# Patient Record
Sex: MALE | Race: WHITE | Employment: OTHER | URBAN - METROPOLITAN AREA
[De-identification: names, ages, dates, MRNs, and addresses within clinical notes are randomized per-mention and may not be internally consistent; named-entity substitution may affect disease eponyms.]

---

## 2018-05-07 ENCOUNTER — TRANSFERRED RECORDS (OUTPATIENT)
Dept: HEALTH INFORMATION MANAGEMENT | Facility: CLINIC | Age: 83
End: 2018-05-07

## 2018-05-15 ENCOUNTER — TRANSFERRED RECORDS (OUTPATIENT)
Dept: HEALTH INFORMATION MANAGEMENT | Facility: CLINIC | Age: 83
End: 2018-05-15

## 2018-05-17 ENCOUNTER — TRANSFERRED RECORDS (OUTPATIENT)
Dept: HEALTH INFORMATION MANAGEMENT | Facility: CLINIC | Age: 83
End: 2018-05-17

## 2018-05-29 ENCOUNTER — TRANSFERRED RECORDS (OUTPATIENT)
Dept: HEALTH INFORMATION MANAGEMENT | Facility: CLINIC | Age: 83
End: 2018-05-29

## 2018-05-30 ENCOUNTER — TRANSFERRED RECORDS (OUTPATIENT)
Dept: HEALTH INFORMATION MANAGEMENT | Facility: CLINIC | Age: 83
End: 2018-05-30

## 2018-06-07 ENCOUNTER — TRANSFERRED RECORDS (OUTPATIENT)
Dept: HEALTH INFORMATION MANAGEMENT | Facility: CLINIC | Age: 83
End: 2018-06-07

## 2018-07-09 ENCOUNTER — TRANSFERRED RECORDS (OUTPATIENT)
Dept: HEALTH INFORMATION MANAGEMENT | Facility: CLINIC | Age: 83
End: 2018-07-09

## 2018-07-09 LAB
CREAT SERPL-MCNC: 3.4 MG/DL (ref 0.6–1.3)
GFR SERPL CREATININE-BSD FRML MDRD: 15 ML/MIN/1.73M2 (ref 60–130)
GLUCOSE SERPL-MCNC: 290 MG/DL (ref 70–99)
POTASSIUM SERPL-SCNC: 4.5 MMOL/L (ref 3.5–5.1)

## 2018-07-10 ENCOUNTER — TRANSFERRED RECORDS (OUTPATIENT)
Dept: HEALTH INFORMATION MANAGEMENT | Facility: CLINIC | Age: 83
End: 2018-07-10

## 2018-07-13 ENCOUNTER — TRANSFERRED RECORDS (OUTPATIENT)
Dept: HEALTH INFORMATION MANAGEMENT | Facility: CLINIC | Age: 83
End: 2018-07-13

## 2018-07-26 ENCOUNTER — TRANSFERRED RECORDS (OUTPATIENT)
Dept: HEALTH INFORMATION MANAGEMENT | Facility: CLINIC | Age: 83
End: 2018-07-26

## 2018-08-01 ENCOUNTER — TRANSFERRED RECORDS (OUTPATIENT)
Dept: HEALTH INFORMATION MANAGEMENT | Facility: CLINIC | Age: 83
End: 2018-08-01

## 2018-08-02 ENCOUNTER — MEDICAL CORRESPONDENCE (OUTPATIENT)
Dept: HEALTH INFORMATION MANAGEMENT | Facility: CLINIC | Age: 83
End: 2018-08-02

## 2018-08-03 ENCOUNTER — DOCUMENTATION ONLY (OUTPATIENT)
Dept: OTHER | Facility: CLINIC | Age: 83
End: 2018-08-03

## 2018-08-07 ENCOUNTER — ASSISTED LIVING VISIT (OUTPATIENT)
Dept: GERIATRICS | Facility: CLINIC | Age: 83
End: 2018-08-07
Payer: COMMERCIAL

## 2018-08-07 VITALS
HEIGHT: 69 IN | RESPIRATION RATE: 20 BRPM | HEART RATE: 56 BPM | OXYGEN SATURATION: 97 % | TEMPERATURE: 97.3 F | SYSTOLIC BLOOD PRESSURE: 140 MMHG | DIASTOLIC BLOOD PRESSURE: 70 MMHG | WEIGHT: 214 LBS | BODY MASS INDEX: 31.7 KG/M2

## 2018-08-07 DIAGNOSIS — I10 BENIGN ESSENTIAL HYPERTENSION: ICD-10-CM

## 2018-08-07 DIAGNOSIS — I73.9 PVD (PERIPHERAL VASCULAR DISEASE) (H): ICD-10-CM

## 2018-08-07 DIAGNOSIS — L97.519: ICD-10-CM

## 2018-08-07 DIAGNOSIS — Z71.89 ADVANCE CARE PLANNING: ICD-10-CM

## 2018-08-07 DIAGNOSIS — I65.23 BILATERAL CAROTID ARTERY OCCLUSION: ICD-10-CM

## 2018-08-07 DIAGNOSIS — F43.21 ADJUSTMENT DISORDER WITH DEPRESSED MOOD: ICD-10-CM

## 2018-08-07 DIAGNOSIS — I25.10 ASCVD (ARTERIOSCLEROTIC CARDIOVASCULAR DISEASE): Primary | ICD-10-CM

## 2018-08-07 DIAGNOSIS — Z87.39 H/O: GOUT: ICD-10-CM

## 2018-08-07 DIAGNOSIS — N18.4 CKD (CHRONIC KIDNEY DISEASE) STAGE 4, GFR 15-29 ML/MIN (H): ICD-10-CM

## 2018-08-07 DIAGNOSIS — M19.90 INFLAMMATORY ARTHRITIS: ICD-10-CM

## 2018-08-07 DIAGNOSIS — E11.8 TYPE 2 DIABETES MELLITUS WITH COMPLICATION, WITHOUT LONG-TERM CURRENT USE OF INSULIN (H): ICD-10-CM

## 2018-08-07 DIAGNOSIS — Z86.73 H/O: CVA (CEREBROVASCULAR ACCIDENT): ICD-10-CM

## 2018-08-07 DIAGNOSIS — F01.53 MULTI-INFARCT DEMENTIA WITH DEPRESSION (H): ICD-10-CM

## 2018-08-07 PROCEDURE — 99497 ADVNCD CARE PLAN 30 MIN: CPT | Performed by: NURSE PRACTITIONER

## 2018-08-07 RX ORDER — POLYETHYLENE GLYCOL 3350
17 POWDER (GRAM) MISCELLANEOUS DAILY PRN
COMMUNITY

## 2018-08-07 RX ORDER — TIMOLOL MALEATE 5 MG/ML
1 SOLUTION/ DROPS OPHTHALMIC 2 TIMES DAILY
COMMUNITY
End: 2018-10-09

## 2018-08-07 RX ORDER — ASPIRIN 325 MG
81 TABLET ORAL DAILY
COMMUNITY
End: 2018-09-12 | Stop reason: DRUGHIGH

## 2018-08-07 RX ORDER — MULTIPLE VITAMINS W/ MINERALS TAB 9MG-400MCG
1 TAB ORAL DAILY
COMMUNITY
End: 2018-09-11

## 2018-08-07 RX ORDER — AMOXICILLIN 500 MG
1200 CAPSULE ORAL DAILY
COMMUNITY
End: 2018-09-11

## 2018-08-07 RX ORDER — VITS A,C,E/LUTEIN/MINERALS 300MCG-200
1 TABLET ORAL 2 TIMES DAILY
COMMUNITY
End: 2018-09-11

## 2018-08-07 NOTE — LETTER
"    8/7/2018        RE: Cecil Martinez  [facility]  [address] [place] [address]        [facility]  PRIMARY CARE PROVIDER AND CLINIC:  Leilani Aguila [address] / [place] [address]  Chief Complaint   Patient presents with     [facility]     [facility] Medical Record Number:  [id number]    HPI:    Cecil Martinez is a 88 year old  (7/8/1930),admitted to the [facility] from [facility], [place], [place] on 8/6/18.  Admitted to this facility for  rehab, medical management and nursing care.  HPI information obtained from: facility chart records, facility staff, patient report, [facility] chart review, Care Everywhere Epic chart review and family/first contact daughter report.      Current issues are:      ASCVD (arteriosclerotic cardiovascular disease)  Bilateral carotid artery occlusion  H/O: CVA (cerebrovascular accident)  Minimal history from previous provider as patient came from [place], [place]. Long conversation with daughter regarding patient's history. Apparently patient was fairly heathy up until April 2018 when he presented to ED with stroke like symptoms. Patient found to have several lacunar strokes \"probably emboli from internal carotid arteries\". L carotid artery completely occluded, R carotid severe. Patient had additional strokes during rehab (May & July). Daughter reports patient & family declined intervention for occluded carotid arteries. Patient previous smoker: 25-50 pk/yr. Patient denies CP, SOB, cough, dizziness, fevers, chills, HA, N/V, dysuria or bowel abnormalities.     Multi-infarct dementia with depression  Adjustment disorder with depressed mood  Onset following multiple strokes. Was started on Lexapro & Zoloft (no longer on Lexapro, unsure why). Daughter repeatedly states her main concern is his depression & anger. \"I want him to be happy\".   ADL's/Functional status   Bathing: with assist  Dressing: with " assist  Eating: independent   Transfers: with assist  Toileting: with assist  Continence  Urine: continent with assist  Stool: continent with assist  Dementia: moderate, at this time- no access to cognitive testing    PVD (peripheral vascular disease) (H)  Ischemic toe ulcer, right, with unspecified severity (H)  Patient went through revascularization to LLE which successfully restored some blood supply to extremity. Ongoing ulcer to R toe, MRI 5/29 of R toe indicated no osteomyelitis. Patient reports discomfort with palpation to R 3rd toe.     Type 2 diabetes mellitus with complication, without long-term current use of insulin (H)  No BG on file. Could not find A1c in paperwork. Patient taking metformin 500mg BID.    Inflammatory arthritis  H/O: gout  Remains on allopurinol 100mg daily, unclear when this was started or when last gout attack occurred. Patient denies pain today. Utilizing diclofenac gel QID to R wrist for arthritis.     Benign essential hypertension  CKD (chronic kidney disease) stage 4, 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)  BP today 140/70 with HR 56. Patient denies dizziness, headache or palpitations. Last BMP from May indicates Cr 3.4 & GFR 15    Advanced directives  Current code status indicates Full Code.       Last 3 BPs: (7/23) 120/73   HR Ranges: 67   Admission Weight: 214lbs  Current Weight: (7/17) 214 lbs    CODE STATUS/ADVANCE DIRECTIVES DISCUSSION:   CPR/Full code   Patient's living condition: lives in an assisted living facility    ALLERGIES:Hmg-coa-r inhibitors and Sitagliptin  PAST MEDICAL HISTORY:  has a past medical history of Carotid stenosis; CVA (cerebral vascular accident) (H); Diabetes mellitus (H); and PVD (peripheral vascular disease) (H).  PAST SURGICAL HISTORY:  has a past surgical history that includes knee surgery and stroke or tia >120 days cea (05/02/2018).  FAMILY HISTORY: family history includes Cerebrovascular Disease in his father.  SOCIAL HISTORY:  reports that he has quit smoking.  His smoking use included Pipe. He does not have any smokeless tobacco history on file.    Post Discharge Medication Reconciliation Status: discharge medications reconciled and changed, per note/orders (see AVS).  Current Outpatient Prescriptions   Medication Sig Dispense Refill     Acetaminophen (MAPAP PO) Take 650 mg by mouth every 4 hours as needed for mild pain or fever       ALLOPURINOL PO Take 100 mg by mouth       ARTIFICIAL TEAR OP Apply 1 drop to eye 4 times daily as needed To both eyes qid prn for dry/itchy eyes.       aspirin 325 MG tablet Take 325 mg by mouth daily       cholecalciferol (VITAMIN D3) 1000 UNIT tablet Take 1,000 Units by mouth 2 times daily       COENZYME Q-10 PO Take 200 mg by mouth daily       diclofenac (VOLTAREN) 1 % GEL topical gel Place onto the skin 4 times daily Apply to right wrist small layer TD       EMOLLIENT EX Externally apply topically 2 times daily To dry patch right hand index finger       METFORMIN HCL PO Take 500 mg by mouth 2 times daily       METOPROLOL SUCCINATE ER PO Take 100 mg by mouth 2 times daily       multivitamin (OCUVITE) TABS tablet Take 1 tablet by mouth 2 times daily       multivitamin, therapeutic with minerals (THERA-VIT-M) TABS tablet Take 1 tablet by mouth daily       Omega-3 Fatty Acids (FISH OIL) 1200 MG capsule Take 1,200 mg by mouth daily       ONDANSETRON PO Take 4 mg by mouth every 4 hours as needed for nausea       polyethylene glycol 3350 POWD 17 g daily as needed       PRAVASTATIN SODIUM PO Take 40 mg by mouth daily       Rosiglitazone Maleate (AVANDIA PO) Take 4 mg by mouth daily       SERTRALINE HCL PO Take 150 mg by mouth daily        Ticagrelor (BRILINTA PO) Take 90 mg by mouth 2 times daily       timolol (TIMOPTIC) 0.5 % ophthalmic solution Place 1 drop into both eyes 2 times daily         ROS:  10 point ROS of systems including Constitutional, Eyes, Respiratory, Cardiovascular, Gastroenterology, Genitourinary, Integumentary,  "Muscularskeletal, Psychiatric were all negative except for pertinent positives noted in my HPI.    Exam:  /70  Pulse 56  Temp 97.3  F (36.3  C)  Resp 20  Ht 5' 9\" (1.753 m)  Wt 214 lb (97.1 kg)  SpO2 97%  BMI 31.6 kg/m2  GENERAL APPEARANCE:  Alert, in no distress, uncooperative, resting in recliner  ENT:  Mouth and posterior oropharynx normal, moist mucous membranes  EYES:  EOM normal, Conjunctiva and lids normal  NECK:  No adenopathy,masses or thyromegaly  RESP:  respiratory effort and palpation of chest normal, lungs clear to auscultation , no respiratory distress  CV:  Palpation and auscultation of heart done , regular rate and rhythm, no murmur, rub, or gallop, no edema  ABDOMEN:  normal bowel sounds, soft, nontender, no hepatosplenomegaly or other masses, no guarding or rebound  M/S:   equal strength, ambulates with A1 & walker, sensation intact, boot to R foot  SKIN:  wound to R 3rd toe, no s/s infection, thickened skin/scab covering ulcer, no drainage, no periwound redness  NEURO:   Cranial nerves 2-12 are normal tested and grossly at patient's baseline, normal speech, equal strength  PSYCH:  insight and judgement impaired, memory impaired , irritable    Lab/Diagnostic data:  No recent labs available    ASSESSMENT/PLAN:  (I25.10) ASCVD (arteriosclerotic cardiovascular disease)  (primary encounter diagnosis)  (I65.23) Bilateral carotid artery occlusion  (Z86.73) H/O: CVA (cerebrovascular accident)  Comment: CVIs April, May & July, cognitive deficits, high risk for additional strokes & clots    Plan:   --order physical therapy & occupational therapy for strength training, endurance, safety & cognitive testing   --continue asa 325mg daily, pravastatin 40mg daily & Brilinta 90mg BID  --continue supplements CoQ10 & fish oil  --HTN and HLD management for secondary stroke prevention   --monitor for stroke like symptoms   --consider hospice following completion of therapies     (F01.51,  F32.9) " Multi-infarct dementia with depression  (F43.21) Adjustment disorder with depressed mood  Comment: acute following stroke, poorly controlled   Plan:   --occupational therapy for cognitive testing  --provide assist with all ADLs  --appropriate for secure memory care unit  --increase Zoloft for ongoing depressed mood  --monitor for increase in behaviors or change in sleep & eating patterns    (I73.9) PVD (peripheral vascular disease) (H)  (L97.519) Ischemic toe ulcer, right, with unspecified severity (H)  Comment: unclear onset, no s/s of infection, LLE revascularized   Plan:   --home care RN for wound care  --continue current wound care for now: cleanse, betadine, 2x2 & kerlix daily    (E11.8) Type 2 diabetes mellitus with complication, without long-term current use of insulin (H)  Comment: chronic, unclear control  Plan:   --continue Avandia 4mg daily & Metformin 500mg BID  --follow A1c & kidney function  --hold off on daily BG checks for now    (M19.90) Inflammatory arthritis  (Z87.39) H/O: gout  Comment: chronic, stable  Plan:   --continue allopurinol 100mg daily  --recheck uric acid level  --continue diclofenac gel QID to R wrist  --monitor for s/s infection    (I10) Benign essential hypertension  (N18.4) CKD (chronic kidney disease) stage 4, 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)  Comment: chronic, controlled & stable  Plan:   --continue metoprolol succinate 100mg daily  --avoid nephrotoxic medications   --follow kidney function & BPs, adjust as clinically indicated     (Z71.89) Advance care planning  Comment: I an additional 22 minutes face to face with patient & with patient's daughter discussing advance care planning, goals of care & completing POLST.  Plan:   --change code status to DNR/DNI, comfort cares, avoid hospitalizations   --will complete POLST & fax to     Orders:  1. Increase Zoloft to 150 mg po daily - depression  2. Home care PT,OT, RN/wound care   3. CMP, CBC w/ plt, A1C, fasting lipid panel & TSH - CKD,  anemia, DM2, hyperlipidemia, depression & confusion       Total time with a new patient visit in the assisted livin minutes including patient visit, review of past records, discussions about the POC and care coordination with the patient and family, daughter. Greater than 50% of total time spent with counseling and coordinating care regarding conditions and treatment options as mentioned above.    Electronically signed by:  Leilani Aguila NP        Documentation of Face-to-Face and Certification for Home Health Services     Patient: Cecil Martinez   YOB: 1930  MR Number: [id number]  Today's Date: 2018    I certify that patient: Cecil Martinez is under my care and that I, or a nurse practitioner or physician's assistant working with me, had a face-to-face encounter that meets the physician face-to-face encounter requirements with this patient on: 18.    This encounter with the patient was in whole, or in part, for the following medical condition, which is the primary reason for home health care:   1. ASCVD (arteriosclerotic cardiovascular disease)    2. Bilateral carotid artery occlusion    3. H/O: CVA (cerebrovascular accident)    4. Multi-infarct dementia with depression    5. Adjustment disorder with depressed mood    6. PVD (peripheral vascular disease) (H)    7. Ischemic toe ulcer, right, with unspecified severity (H)    8. Type 2 diabetes mellitus with complication, without long-term current use of insulin (H)    9. Inflammatory arthritis    10. H/O: gout    11. Benign essential hypertension    12. CKD (chronic kidney disease) stage 4, 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)    13. Advance care planning      .    I certify that, based on my findings, the following services are medically necessary home health services: Nursing, Occupational Therapy and Physical Therapy.    My clinical findings support the need for the above services because: Nurse is needed: wound care to R 3rd toes ulcer.,  Occupational Therapy Services are needed to assess and treat cognitive ability and address ADL safety due to impairment in cognitive deficits 2/2 multiple strokes. and Physical Therapy Services are needed to assess and treat the following functional impairments: physical deconditioning, safety & balance.    Further, I certify that my clinical findings support that this patient is homebound (i.e. absences from home require considerable and taxing effort and are for medical reasons or [employer] services or infrequently or of short duration when for other reasons) because: Requires assistance of another person or specialized equipment to access medical services because patient: Is prone to wander/get lost without assistance. and Requires supervision of another for safe transfer...    Based on the above findings. I certify that this patient is confined to the home and needs intermittent skilled nursing care, physical therapy and/or speech therapy.  The patient is under my care, and I have initiated the establishment of the plan of care.  This patient will be followed by a physician who will periodically review the plan of care.  Physician/Provider to provide follow up care: Leilani Aguila    Responsible Medicare certified P[employer] Physician: Electronically signed by Dr. Devon Reyes MD, and only signing for initial order. Please send all follow up questions and concerns or needed follow up signatures to the PCP Leilani Aguila.    Physician Signature: See electronic signature associated with these discharge orders.  Date: 8/13/2018        Sincerely,        Leilani Aguila NP

## 2018-08-07 NOTE — PROGRESS NOTES
"[place] [facility]  PRIMARY CARE PROVIDER AND CLINIC:  Leilani Aguila [address] / [place] [address]  Chief Complaint   Patient presents with     [facility]     [place] Medical Record Number:  [id number]    HPI:    Cecil Martinez is a 88 year old  (7/8/1930),admitted to the [facility] from [facility], [place], [place] on 8/6/18.  Admitted to this facility for  rehab, medical management and nursing care.  HPI information obtained from: facility chart records, facility staff, patient report, [facility] chart review, Care Everywhere Epic chart review and family/first contact daughter report.      Current issues are:      ASCVD (arteriosclerotic cardiovascular disease)  Bilateral carotid artery occlusion  H/O: CVA (cerebrovascular accident)  Minimal history from previous provider as patient came from [place], [place]. Long conversation with daughter regarding patient's history. Apparently patient was fairly heathy up until April 2018 when he presented to ED with stroke like symptoms. Patient found to have several lacunar strokes \"probably emboli from internal carotid arteries\". L carotid artery completely occluded, R carotid severe. Patient had additional strokes during rehab (May & July). Daughter reports patient & family declined intervention for occluded carotid arteries. Patient previous smoker: 25-50 pk/yr. Patient denies CP, SOB, cough, dizziness, fevers, chills, HA, N/V, dysuria or bowel abnormalities.     Multi-infarct dementia with depression  Adjustment disorder with depressed mood  Onset following multiple strokes. Was started on Lexapro & Zoloft (no longer on Lexapro, unsure why). Daughter repeatedly states her main concern is his depression & anger. \"I want him to be happy\".   ADL's/Functional status   Bathing: with assist  Dressing: with assist  Eating: independent   Transfers: with assist  Toileting: with assist  Continence  Urine: continent with " assist  Stool: continent with assist  Dementia: moderate, at this time- no access to cognitive testing    PVD (peripheral vascular disease) (H)  Ischemic toe ulcer, right, with unspecified severity (H)  Patient went through revascularization to LLE which successfully restored some blood supply to extremity. Ongoing ulcer to R toe, MRI 5/29 of R toe indicated no osteomyelitis. Patient reports discomfort with palpation to R 3rd toe.     Type 2 diabetes mellitus with complication, without long-term current use of insulin (H)  No BG on file. Could not find A1c in paperwork. Patient taking metformin 500mg BID.    Inflammatory arthritis  H/O: gout  Remains on allopurinol 100mg daily, unclear when this was started or when last gout attack occurred. Patient denies pain today. Utilizing diclofenac gel QID to R wrist for arthritis.     Benign essential hypertension  CKD (chronic kidney disease) stage 4, 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)  BP today 140/70 with HR 56. Patient denies dizziness, headache or palpitations. Last BMP from May indicates Cr 3.4 & GFR 15    Advanced directives  Current code status indicates Full Code.       Last 3 BPs: (7/23) 120/73   HR Ranges: 67   Admission Weight: 214lbs  Current Weight: (7/17) 214 lbs    CODE STATUS/ADVANCE DIRECTIVES DISCUSSION:   CPR/Full code   Patient's living condition: lives in an assisted living facility    ALLERGIES:Hmg-coa-r inhibitors and Sitagliptin  PAST MEDICAL HISTORY:  has a past medical history of Carotid stenosis; CVA (cerebral vascular accident) (H); Diabetes mellitus (H); and PVD (peripheral vascular disease) (H).  PAST SURGICAL HISTORY:  has a past surgical history that includes knee surgery and stroke or tia >120 days cea (05/02/2018).  FAMILY HISTORY: family history includes Cerebrovascular Disease in his father.  SOCIAL HISTORY:  reports that he has quit smoking. His smoking use included Pipe. He does not have any smokeless tobacco history on file.    Post Discharge  Medication Reconciliation Status: discharge medications reconciled and changed, per note/orders (see AVS).  Current Outpatient Prescriptions   Medication Sig Dispense Refill     Acetaminophen (MAPAP PO) Take 650 mg by mouth every 4 hours as needed for mild pain or fever       ALLOPURINOL PO Take 100 mg by mouth       ARTIFICIAL TEAR OP Apply 1 drop to eye 4 times daily as needed To both eyes qid prn for dry/itchy eyes.       aspirin 325 MG tablet Take 325 mg by mouth daily       cholecalciferol (VITAMIN D3) 1000 UNIT tablet Take 1,000 Units by mouth 2 times daily       COENZYME Q-10 PO Take 200 mg by mouth daily       diclofenac (VOLTAREN) 1 % GEL topical gel Place onto the skin 4 times daily Apply to right wrist small layer TD       EMOLLIENT EX Externally apply topically 2 times daily To dry patch right hand index finger       METFORMIN HCL PO Take 500 mg by mouth 2 times daily       METOPROLOL SUCCINATE ER PO Take 100 mg by mouth 2 times daily       multivitamin (OCUVITE) TABS tablet Take 1 tablet by mouth 2 times daily       multivitamin, therapeutic with minerals (THERA-VIT-M) TABS tablet Take 1 tablet by mouth daily       Omega-3 Fatty Acids (FISH OIL) 1200 MG capsule Take 1,200 mg by mouth daily       ONDANSETRON PO Take 4 mg by mouth every 4 hours as needed for nausea       polyethylene glycol 3350 POWD 17 g daily as needed       PRAVASTATIN SODIUM PO Take 40 mg by mouth daily       Rosiglitazone Maleate (AVANDIA PO) Take 4 mg by mouth daily       SERTRALINE HCL PO Take 150 mg by mouth daily        Ticagrelor (BRILINTA PO) Take 90 mg by mouth 2 times daily       timolol (TIMOPTIC) 0.5 % ophthalmic solution Place 1 drop into both eyes 2 times daily         ROS:  10 point ROS of systems including Constitutional, Eyes, Respiratory, Cardiovascular, Gastroenterology, Genitourinary, Integumentary, Muscularskeletal, Psychiatric were all negative except for pertinent positives noted in my HPI.    Exam:  /70   "Pulse 56  Temp 97.3  F (36.3  C)  Resp 20  Ht 5' 9\" (1.753 m)  Wt 214 lb (97.1 kg)  SpO2 97%  BMI 31.6 kg/m2  GENERAL APPEARANCE:  Alert, in no distress, uncooperative, resting in recliner  ENT:  Mouth and posterior oropharynx normal, moist mucous membranes  EYES:  EOM normal, Conjunctiva and lids normal  NECK:  No adenopathy,masses or thyromegaly  RESP:  respiratory effort and palpation of chest normal, lungs clear to auscultation , no respiratory distress  CV:  Palpation and auscultation of heart done , regular rate and rhythm, no murmur, rub, or gallop, no edema  ABDOMEN:  normal bowel sounds, soft, nontender, no hepatosplenomegaly or other masses, no guarding or rebound  M/S:   equal strength, ambulates with A1 & walker, sensation intact, boot to R foot  SKIN:  wound to R 3rd toe, no s/s infection, thickened skin/scab covering ulcer, no drainage, no periwound redness  NEURO:   Cranial nerves 2-12 are normal tested and grossly at patient's baseline, normal speech, equal strength  PSYCH:  insight and judgement impaired, memory impaired , irritable    Lab/Diagnostic data:  No recent labs available    ASSESSMENT/PLAN:  (I25.10) ASCVD (arteriosclerotic cardiovascular disease)  (primary encounter diagnosis)  (I65.23) Bilateral carotid artery occlusion  (Z86.73) H/O: CVA (cerebrovascular accident)  Comment: CVIs April, May & July, cognitive deficits, high risk for additional strokes & clots    Plan:   --order physical therapy & occupational therapy for strength training, endurance, safety & cognitive testing   --continue asa 325mg daily, pravastatin 40mg daily & Brilinta 90mg BID  --continue supplements CoQ10 & fish oil  --HTN and HLD management for secondary stroke prevention   --monitor for stroke like symptoms   --consider hospice following completion of therapies     (F01.51,  F32.9) Multi-infarct dementia with depression  (F43.21) Adjustment disorder with depressed mood  Comment: acute following stroke, " poorly controlled   Plan:   --occupational therapy for cognitive testing  --provide assist with all ADLs  --appropriate for secure memory care unit  --increase Zoloft for ongoing depressed mood  --monitor for increase in behaviors or change in sleep & eating patterns    (I73.9) PVD (peripheral vascular disease) (H)  (L97.519) Ischemic toe ulcer, right, with unspecified severity (H)  Comment: unclear onset, no s/s of infection, LLE revascularized   Plan:   --home care RN for wound care  --continue current wound care for now: cleanse, betadine, 2x2 & kerlix daily    (E11.8) Type 2 diabetes mellitus with complication, without long-term current use of insulin (H)  Comment: chronic, unclear control  Plan:   --continue Avandia 4mg daily & Metformin 500mg BID  --follow A1c & kidney function  --hold off on daily BG checks for now    (M19.90) Inflammatory arthritis  (Z87.39) H/O: gout  Comment: chronic, stable  Plan:   --continue allopurinol 100mg daily  --recheck uric acid level  --continue diclofenac gel QID to R wrist  --monitor for s/s infection    (I10) Benign essential hypertension  (N18.4) CKD (chronic kidney disease) stage 4, 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)  Comment: chronic, controlled & stable  Plan:   --continue metoprolol succinate 100mg daily  --avoid nephrotoxic medications   --follow kidney function & BPs, adjust as clinically indicated     (Z71.89) Advance care planning  Comment: I an additional 22 minutes face to face with patient & with patient's daughter discussing advance care planning, goals of care & completing POLST.  Plan:   --change code status to DNR/DNI, comfort cares, avoid hospitalizations   --will complete POLST & fax to     Orders:  1. Increase Zoloft to 150 mg po daily - depression  2. Home care PT,OT, RN/wound care   3. CMP, CBC w/ plt, A1C, fasting lipid panel & TSH - CKD, anemia, DM2, hyperlipidemia, depression & confusion       Total time with a new patient visit in the assisted livin minutes  including patient visit, review of past records, discussions about the POC and care coordination with the patient and family, daughter. Greater than 50% of total time spent with counseling and coordinating care regarding conditions and treatment options as mentioned above.    Electronically signed by:  Leilani Aguila NP        Documentation of Face-to-Face and Certification for Home Health Services     Patient: Cecil Martinez   YOB: 1930  MR Number: [id number]  Today's Date: 8/13/2018    I certify that patient: Cecil Martinez is under my care and that I, or a nurse practitioner or physician's assistant working with me, had a face-to-face encounter that meets the physician face-to-face encounter requirements with this patient on: 8/7/18.    This encounter with the patient was in whole, or in part, for the following medical condition, which is the primary reason for home health care:   1. ASCVD (arteriosclerotic cardiovascular disease)    2. Bilateral carotid artery occlusion    3. H/O: CVA (cerebrovascular accident)    4. Multi-infarct dementia with depression    5. Adjustment disorder with depressed mood    6. PVD (peripheral vascular disease) (H)    7. Ischemic toe ulcer, right, with unspecified severity (H)    8. Type 2 diabetes mellitus with complication, without long-term current use of insulin (H)    9. Inflammatory arthritis    10. H/O: gout    11. Benign essential hypertension    12. CKD (chronic kidney disease) stage 4, 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)    13. Advance care planning      .    I certify that, based on my findings, the following services are medically necessary home health services: Nursing, Occupational Therapy and Physical Therapy.    My clinical findings support the need for the above services because: Nurse is needed: wound care to R 3rd toes ulcer., Occupational Therapy Services are needed to assess and treat cognitive ability and address ADL safety due to impairment in  cognitive deficits 2/2 multiple strokes. and Physical Therapy Services are needed to assess and treat the following functional impairments: physical deconditioning, safety & balance.    Further, I certify that my clinical findings support that this patient is homebound (i.e. absences from home require considerable and taxing effort and are for medical reasons or [employer] services or infrequently or of short duration when for other reasons) because: Requires assistance of another person or specialized equipment to access medical services because patient: Is prone to wander/get lost without assistance. and Requires supervision of another for safe transfer...    Based on the above findings. I certify that this patient is confined to the home and needs intermittent skilled nursing care, physical therapy and/or speech therapy.  The patient is under my care, and I have initiated the establishment of the plan of care.  This patient will be followed by a physician who will periodically review the plan of care.  Physician/Provider to provide follow up care: Leilani Aguila    Responsible Medicare certified PECOS Physician: Electronically signed by Dr. Devon Reyes MD, and only signing for initial order. Please send all follow up questions and concerns or needed follow up signatures to the PCP Leilani Aguila.    Physician Signature: See electronic signature associated with these discharge orders.  Date: 8/13/2018

## 2018-08-13 PROBLEM — Z87.39 H/O: GOUT: Status: ACTIVE | Noted: 2018-08-13

## 2018-08-13 PROBLEM — L97.529: Status: ACTIVE | Noted: 2018-08-13

## 2018-08-13 PROBLEM — N18.4 CKD (CHRONIC KIDNEY DISEASE) STAGE 4, GFR 15-29 ML/MIN (H): Status: ACTIVE | Noted: 2018-08-13

## 2018-08-13 PROBLEM — Z86.73 H/O: CVA (CEREBROVASCULAR ACCIDENT): Status: ACTIVE | Noted: 2018-08-13

## 2018-08-13 PROBLEM — M19.90 INFLAMMATORY ARTHRITIS: Status: ACTIVE | Noted: 2018-08-13

## 2018-08-13 PROBLEM — I25.10 ASCVD (ARTERIOSCLEROTIC CARDIOVASCULAR DISEASE): Status: ACTIVE | Noted: 2018-08-13

## 2018-08-13 PROBLEM — I73.9 PVD (PERIPHERAL VASCULAR DISEASE) (H): Status: ACTIVE | Noted: 2018-08-13

## 2018-08-13 PROBLEM — I65.23 BILATERAL CAROTID ARTERY OCCLUSION: Status: ACTIVE | Noted: 2018-08-13

## 2018-08-13 PROBLEM — E11.8 TYPE 2 DIABETES MELLITUS WITH COMPLICATION, WITHOUT LONG-TERM CURRENT USE OF INSULIN (H): Status: ACTIVE | Noted: 2018-08-13

## 2018-08-13 PROBLEM — I10 BENIGN ESSENTIAL HYPERTENSION: Status: ACTIVE | Noted: 2018-08-13

## 2018-08-13 PROBLEM — N18.30 CKD (CHRONIC KIDNEY DISEASE) STAGE 3, GFR 30-59 ML/MIN (H): Status: ACTIVE | Noted: 2018-08-13

## 2018-08-13 PROBLEM — F01.53 MULTI-INFARCT DEMENTIA WITH DEPRESSION (H): Status: ACTIVE | Noted: 2018-08-13

## 2018-08-13 PROBLEM — F43.21 ADJUSTMENT DISORDER WITH DEPRESSED MOOD: Status: ACTIVE | Noted: 2018-08-13

## 2018-08-18 ENCOUNTER — DOCUMENTATION ONLY (OUTPATIENT)
Dept: OTHER | Facility: CLINIC | Age: 83
End: 2018-08-18

## 2018-08-23 ENCOUNTER — TELEPHONE (OUTPATIENT)
Dept: FAMILY MEDICINE | Facility: CLINIC | Age: 83
End: 2018-08-23

## 2018-08-23 NOTE — TELEPHONE ENCOUNTER
Ernestine with OT with [facility] care OT requesting verbal orders for patient from [facility].  OT assessment was completed yesterday.  OT is requesting  2 visits this month -August and 3 visits for September for ADL's, cognitive assessment, upper extremity strengthening, safety service and equipment recommendation.   Verbal orders given.    Yesenia Shipman RN  [employer] [facility]

## 2018-08-26 DIAGNOSIS — Z78.9 TAKES DIETARY SUPPLEMENTS: ICD-10-CM

## 2018-08-26 DIAGNOSIS — F41.9 ANXIETY: Primary | ICD-10-CM

## 2018-08-26 DIAGNOSIS — E55.9 VITAMIN D DEFICIENCY: ICD-10-CM

## 2018-08-26 DIAGNOSIS — E11.69 TYPE 2 DIABETES MELLITUS WITH OTHER SPECIFIED COMPLICATION, UNSPECIFIED LONG TERM INSULIN USE STATUS: ICD-10-CM

## 2018-08-26 DIAGNOSIS — I25.10 ASCVD (ARTERIOSCLEROTIC CARDIOVASCULAR DISEASE): ICD-10-CM

## 2018-08-26 DIAGNOSIS — Z87.39 HX OF GOUT: ICD-10-CM

## 2018-08-26 DIAGNOSIS — F32.A DEPRESSION, UNSPECIFIED DEPRESSION TYPE: ICD-10-CM

## 2018-08-26 DIAGNOSIS — I10 ESSENTIAL HYPERTENSION: ICD-10-CM

## 2018-08-27 ENCOUNTER — TELEPHONE (OUTPATIENT)
Dept: FAMILY MEDICINE | Facility: CLINIC | Age: 83
End: 2018-08-27

## 2018-08-27 NOTE — TELEPHONE ENCOUNTER
Reason for Call: Request for an order or referral:    Order or referral being requested: Orders for a  one time for the patient to adjust to his new enviroment.      Date needed: as soon as possible    Has the patient been seen by the PCP for this problem? NO    Additional comments: Per AB I am sending this request for orders for this patient, as the Home Care nurse stated that you were his attending physician    Phone number Home care nurse can be reached at: [phone]    Best Time:  anytime    Can we leave a detailed message on this number?  YES    Call taken on 8/27/2018 at 12:16 PM by Brittany Chicas

## 2018-08-27 NOTE — TELEPHONE ENCOUNTER
Detailed message left for Koko advising TS okayed one time SW visit.      Ale Iqbal, BS, RN, N  [facility]) [phone]

## 2018-08-28 ENCOUNTER — ASSISTED LIVING VISIT (OUTPATIENT)
Dept: GERIATRICS | Facility: CLINIC | Age: 83
End: 2018-08-28
Payer: COMMERCIAL

## 2018-08-28 VITALS
SYSTOLIC BLOOD PRESSURE: 159 MMHG | TEMPERATURE: 93.7 F | HEART RATE: 61 BPM | WEIGHT: 218 LBS | RESPIRATION RATE: 21 BRPM | OXYGEN SATURATION: 98 % | DIASTOLIC BLOOD PRESSURE: 71 MMHG | BODY MASS INDEX: 32.19 KG/M2

## 2018-08-28 DIAGNOSIS — F01.53 MULTI-INFARCT DEMENTIA WITH DEPRESSION (H): ICD-10-CM

## 2018-08-28 DIAGNOSIS — F43.21 ADJUSTMENT DISORDER WITH DEPRESSED MOOD: Primary | ICD-10-CM

## 2018-08-28 DIAGNOSIS — I10 BENIGN ESSENTIAL HYPERTENSION: ICD-10-CM

## 2018-08-28 DIAGNOSIS — E11.8 TYPE 2 DIABETES MELLITUS WITH COMPLICATION, WITHOUT LONG-TERM CURRENT USE OF INSULIN (H): ICD-10-CM

## 2018-08-28 DIAGNOSIS — N18.4 CKD (CHRONIC KIDNEY DISEASE) STAGE 4, GFR 15-29 ML/MIN (H): ICD-10-CM

## 2018-08-28 NOTE — LETTER
"    8/28/2018        RE: Cecil Martinez  [facility]  [address] [place] [address]        [place] [facility]    Chief Complaint   Patient presents with     RECHECK       [place] Medical Record Number:  [id number]    HPI:    Cecil Martinez is a 88 year old  (7/8/1930), who is being seen today for an episodic care visit at [facility], [place].  HPI information obtained from: facility chart records, facility staff and patient report.    Today's concern is:  Multi-infarct dementia with depression  Adjustment disorder with depressed mood  Onset following multiple strokes. Was started on Lexapro & Zoloft (no longer on Lexapro, unsure why). Daughter repeatedly states her main concern is his depression & anger. \"I want him to be happy\". Patient has had some improvement in behaviors however continues with occasional aggression & threats to staff. Apparently \"allowed\" wife to leave apartment with staff for the first time since she arrived. Patient noticeably upset today, yelling, angry with writer \"get out of here\".    Type 2 diabetes mellitus with complication, without long-term current use of insulin (H)  No BG on file. Could not find A1c in paperwork, patient refused multiple attempts at blood draw since arrival. Patient taking metformin 500mg BID.    Benign essential hypertension  CKD (chronic kidney disease) stage 4, 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)  Last BMP from May indicates Cr 3.4 & GFR 15. Patient refused multiple attempts at blood draw.  Last 3 BPs: (7/23) 120/73, (8/7) 140/70, (8/28) 159/71  HR Ranges: 61-67       Admission Weight: 214lbs  Current Weight: (8/28) 218 lbs      ALLERGIES: Hmg-coa-r inhibitors and Sitagliptin  Past Medical, Surgical, Family and Social History reviewed and updated in [facility].    Current Outpatient Prescriptions   Medication Sig Dispense Refill     Acetaminophen (MAPAP PO) Take 650 mg by mouth every 4 hours as needed for mild pain or fever       ALLOPURINOL PO Take " 100 mg by mouth       ARTIFICIAL TEAR OP Apply 1 drop to eye 4 times daily as needed To both eyes qid prn for dry/itchy eyes.       aspirin 325 MG tablet Take 325 mg by mouth daily       cholecalciferol (VITAMIN D3) 1000 UNIT tablet Take 1,000 Units by mouth 2 times daily       COENZYME Q-10 PO Take 200 mg by mouth daily       diclofenac (VOLTAREN) 1 % GEL topical gel Place onto the skin 4 times daily Apply to right wrist small layer TD       EMOLLIENT EX Externally apply topically 2 times daily To dry patch right hand index finger       METFORMIN HCL PO Take 500 mg by mouth 2 times daily       METOPROLOL SUCCINATE ER PO Take 100 mg by mouth 2 times daily       multivitamin (OCUVITE) TABS tablet Take 1 tablet by mouth 2 times daily       multivitamin, therapeutic with minerals (THERA-VIT-M) TABS tablet Take 1 tablet by mouth daily       Omega-3 Fatty Acids (FISH OIL) 1200 MG capsule Take 1,200 mg by mouth daily       ONDANSETRON PO Take 4 mg by mouth every 4 hours as needed for nausea       polyethylene glycol 3350 POWD 17 g daily as needed       PRAVASTATIN SODIUM PO Take 40 mg by mouth daily       Rosiglitazone Maleate (AVANDIA PO) Take 4 mg by mouth daily       SERTRALINE HCL PO Take 150 mg by mouth daily        Ticagrelor (BRILINTA PO) Take 90 mg by mouth 2 times daily       timolol (TIMOPTIC) 0.5 % ophthalmic solution Place 1 drop into both eyes 2 times daily       Medications reviewed:  Medications reconciled to facility chart and changes were made to reflect current medications as identified as above med list. Below are the changes that were made:   Medications stopped since last EPIC medication reconciliation:   There are no discontinued medications.    Medications started since last [facility] medication reconciliation:  No orders of the defined types were placed in this encounter.    REVIEW OF SYSTEMS:  Unable 2/2 agitation & aggression    Physical Exam:  /71  Pulse 61  Temp 93.7  F (34.3  C)  Resp 21   Wt 218 lb (98.9 kg)  SpO2 98%  BMI 32.19 kg/m2   GENERAL APPEARANCE:  Alert, angry, uncooperative, sitting in recliner  ENT:  Mouth and posterior oropharynx normal, moist mucous membranes  EYES: Conjunctiva and lids normal  RESP:  Unable to auscultate d/t patient refusal, no respiratory distress  CV:  Unable to auscultate d/t patient refusal, no obvious edema  ABDOMEN:  Unable to auscultate d/t patient refusal  M/S:  ambulates with A1 & walker, boot to R foot  SKIN:  Unable to assess d/t patient refusal  NEURO:  normal speech, equal strength  PSYCH:  insight and judgement impaired, memory impaired, irritable, agitated     Recent Labs:  Last Basic Metabolic Panel:  Recent Labs   Lab Test 07/09/18   POTASSIUM  4.5   CR  3.4*   GLC  290*       Assessment/Plan:  (F43.21) Adjustment disorder with depressed mood  (F01.51,  F32.9) Multi-infarct dementia with depression  Comment: acute following stroke, poorly controlled   Plan:   --trial gabapentin 100mg BID prior to initiating antipsychotic medication mor mood stabilizers, titrate as clinically indicated   --additional increase in Zoloft  --monitor for increase in behaviors or change in sleep & eating patterns  --provide assist with all ADLs  --appropriate for secure memory care unit    (E11.8) Type 2 diabetes mellitus with complication, without long-term current use of insulin (H)  Comment: chronic, unclear control, no labs  Plan:   --continue Avandia 4mg daily & Metformin 500mg BID  --follow A1c & kidney function  --hold off on daily BG checks for now    (I10) Benign essential hypertension  (N18.4) CKD (chronic kidney disease) stage 4, 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)  Comment: chronic, generally controlled  Plan:   --continue metoprolol succinate 100mg daily  --avoid nephrotoxic medications   --follow kidney function & BPs, adjust as clinically indicated     Orders:  1. Increase Zoloft to 200 mg po daily - depression & agitation   2. Start gabapentin 100mg BID - anxiety &  aggression   3. Discontinue PRN Zofran  4. Continue to re attempt blood draw       Electronically signed by  LACY Suazo CNP                    Sincerely,        Leilani Aguila, NP

## 2018-08-28 NOTE — PROGRESS NOTES
"[facility]    Chief Complaint   Patient presents with     SMITH       [facility] Medical Record Number:  [id number]    HPI:    Cecil Martinez is a 88 year old  (7/8/1930), who is being seen today for an episodic care visit at [facility].  HPI information obtained from: facility chart records, facility staff and patient report.    Today's concern is:  Multi-infarct dementia with depression  Adjustment disorder with depressed mood  Onset following multiple strokes. Was started on Lexapro & Zoloft (no longer on Lexapro, unsure why). Daughter repeatedly states her main concern is his depression & anger. \"I want him to be happy\". Patient has had some improvement in behaviors however continues with occasional aggression & threats to staff. Apparently \"allowed\" wife to leave apartment with staff for the first time since she arrived. Patient noticeably upset today, yelling, angry with writer \"get out of here\".    Type 2 diabetes mellitus with complication, without long-term current use of insulin (H)  No BG on file. Could not find A1c in paperwork, patient refused multiple attempts at blood draw since arrival. Patient taking metformin 500mg BID.    Benign essential hypertension  CKD (chronic kidney disease) stage 4, 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)  Last BMP from May indicates Cr 3.4 & GFR 15. Patient refused multiple attempts at blood draw.  Last 3 BPs: (7/23) 120/73, (8/7) 140/70, (8/28) 159/71  HR Ranges: 61-67       Admission Weight: 214lbs  Current Weight: (8/28) 218 lbs      ALLERGIES: Hmg-coa-r inhibitors and Sitagliptin  Past Medical, Surgical, Family and Social History reviewed and updated in [facility].    Current Outpatient Prescriptions   Medication Sig Dispense Refill     Acetaminophen (MAPAP PO) Take 650 mg by mouth every 4 hours as needed for mild pain or fever       ALLOPURINOL PO Take 100 mg by mouth       ARTIFICIAL TEAR OP Apply 1 drop to eye 4 times daily as needed To both eyes qid prn for " dry/itchy eyes.       aspirin 325 MG tablet Take 325 mg by mouth daily       cholecalciferol (VITAMIN D3) 1000 UNIT tablet Take 1,000 Units by mouth 2 times daily       COENZYME Q-10 PO Take 200 mg by mouth daily       diclofenac (VOLTAREN) 1 % GEL topical gel Place onto the skin 4 times daily Apply to right wrist small layer TD       EMOLLIENT EX Externally apply topically 2 times daily To dry patch right hand index finger       METFORMIN HCL PO Take 500 mg by mouth 2 times daily       METOPROLOL SUCCINATE ER PO Take 100 mg by mouth 2 times daily       multivitamin (OCUVITE) TABS tablet Take 1 tablet by mouth 2 times daily       multivitamin, therapeutic with minerals (THERA-VIT-M) TABS tablet Take 1 tablet by mouth daily       Omega-3 Fatty Acids (FISH OIL) 1200 MG capsule Take 1,200 mg by mouth daily       ONDANSETRON PO Take 4 mg by mouth every 4 hours as needed for nausea       polyethylene glycol 3350 POWD 17 g daily as needed       PRAVASTATIN SODIUM PO Take 40 mg by mouth daily       Rosiglitazone Maleate (AVANDIA PO) Take 4 mg by mouth daily       SERTRALINE HCL PO Take 150 mg by mouth daily        Ticagrelor (BRILINTA PO) Take 90 mg by mouth 2 times daily       timolol (TIMOPTIC) 0.5 % ophthalmic solution Place 1 drop into both eyes 2 times daily       Medications reviewed:  Medications reconciled to facility chart and changes were made to reflect current medications as identified as above med list. Below are the changes that were made:   Medications stopped since last EPIC medication reconciliation:   There are no discontinued medications.    Medications started since last [facility] medication reconciliation:  No orders of the defined types were placed in this encounter.    REVIEW OF SYSTEMS:  Unable 2/2 agitation & aggression    Physical Exam:  /71  Pulse 61  Temp 93.7  F (34.3  C)  Resp 21  Wt 218 lb (98.9 kg)  SpO2 98%  BMI 32.19 kg/m2   GENERAL APPEARANCE:  Alert, angry, uncooperative, sitting  in recliner  ENT:  Mouth and posterior oropharynx normal, moist mucous membranes  EYES: Conjunctiva and lids normal  RESP:  Unable to auscultate d/t patient refusal, no respiratory distress  CV:  Unable to auscultate d/t patient refusal, no obvious edema  ABDOMEN:  Unable to auscultate d/t patient refusal  M/S:  ambulates with A1 & walker, boot to R foot  SKIN:  Unable to assess d/t patient refusal  NEURO:  normal speech, equal strength  PSYCH:  insight and judgement impaired, memory impaired, irritable, agitated     Recent Labs:  Last Basic Metabolic Panel:  Recent Labs   Lab Test 07/09/18   POTASSIUM  4.5   CR  3.4*   GLC  290*       Assessment/Plan:  (F43.21) Adjustment disorder with depressed mood  (F01.51,  F32.9) Multi-infarct dementia with depression  Comment: acute following stroke, poorly controlled   Plan:   --trial gabapentin 100mg BID prior to initiating antipsychotic medication mor mood stabilizers, titrate as clinically indicated   --additional increase in Zoloft  --monitor for increase in behaviors or change in sleep & eating patterns  --provide assist with all ADLs  --appropriate for secure memory care unit    (E11.8) Type 2 diabetes mellitus with complication, without long-term current use of insulin (H)  Comment: chronic, unclear control, no labs  Plan:   --continue Avandia 4mg daily & Metformin 500mg BID  --follow A1c & kidney function  --hold off on daily BG checks for now    (I10) Benign essential hypertension  (N18.4) CKD (chronic kidney disease) stage 4, 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)  Comment: chronic, generally controlled  Plan:   --continue metoprolol succinate 100mg daily  --avoid nephrotoxic medications   --follow kidney function & BPs, adjust as clinically indicated     Orders:  1. Increase Zoloft to 200 mg po daily - depression & agitation   2. Start gabapentin 100mg BID - anxiety & aggression   3. Discontinue PRN Zofran  4. Continue to re attempt blood draw       Electronically signed  by  LACY Suazo CNP

## 2018-08-29 ENCOUNTER — RECORDS - HEALTHEAST (OUTPATIENT)
Dept: LAB | Facility: CLINIC | Age: 83
End: 2018-08-29

## 2018-08-29 ENCOUNTER — TRANSFERRED RECORDS (OUTPATIENT)
Dept: HEALTH INFORMATION MANAGEMENT | Facility: CLINIC | Age: 83
End: 2018-08-29

## 2018-08-29 LAB
ALBUMIN SERPL-MCNC: 2.8 G/DL (ref 3.5–5)
ALBUMIN SERPL-MCNC: 2.8 G/DL (ref 3.5–5)
ALP SERPL-CCNC: 70 U/L (ref 45–120)
ALP SERPL-CCNC: 70 U/L (ref 45–120)
ALT SERPL W P-5'-P-CCNC: <9 U/L (ref 0–45)
ALT SERPL-CCNC: <9 U/L (ref 0–45)
ANION GAP SERPL CALCULATED.3IONS-SCNC: 10 MMOL/L (ref 5–18)
ANION GAP SERPL CALCULATED.3IONS-SCNC: 10 MMOL/L (ref 5–18)
AST SERPL W P-5'-P-CCNC: 12 U/L (ref 0–40)
AST SERPL-CCNC: 12 U/L (ref 0–40)
BILIRUB SERPL-MCNC: 0.4 MG/DL (ref 0–1)
BILIRUB SERPL-MCNC: 0.4 MG/DL (ref 0–1)
BUN SERPL-MCNC: 17 MG/DL (ref 8–28)
BUN SERPL-MCNC: 17 MG/DL (ref 8–28)
CALCIUM SERPL-MCNC: 8.5 MG/DL (ref 8.5–10.5)
CALCIUM SERPL-MCNC: 8.5 MG/DL (ref 8.5–10.5)
CHLORIDE BLD-SCNC: 99 MMOL/L (ref 98–107)
CHLORIDE SERPLBLD-SCNC: 99 MMOL/L (ref 98–107)
CHOLEST SERPL-MCNC: 123 MG/DL
CHOLEST SERPL-MCNC: 123 MG/DL
CO2 SERPL-SCNC: 26 MMOL/L (ref 22–31)
CO2 SERPL-SCNC: 26 MMOL/L (ref 22–31)
CREAT SERPL-MCNC: 1.17 MG/DL (ref 0.4–1.3)
CREAT SERPL-MCNC: 1.17 MG/DL (ref 0.7–1.3)
ERYTHROCYTE [DISTWIDTH] IN BLOOD BY AUTOMATED COUNT: 13.9 % (ref 11–14.5)
ERYTHROCYTE [DISTWIDTH] IN BLOOD BY AUTOMATED COUNT: 13.9 % (ref 11–14.5)
FASTING STATUS PATIENT QL REPORTED: ABNORMAL
FERRITIN SERPL-MCNC: 139 NG/ML (ref 27–300)
GFR SERPL CREATININE-BSD FRML MDRD: 59 ML/MIN/1.73M2
GFR SERPL CREATININE-BSD FRML MDRD: 59 ML/MIN/1.73M2
GLUCOSE BLD-MCNC: 255 MG/DL (ref 70–125)
GLUCOSE SERPL-MCNC: 255 MG/DL (ref 255–125)
HBA1C MFR BLD: 7.4 % (ref 4.2–6.1)
HCT VFR BLD AUTO: 38.9 % (ref 40–54)
HCT VFR BLD AUTO: 38.9 % (ref 40–54)
HDLC SERPL-MCNC: 32 MG/DL
HDLC SERPL-MCNC: 32 MG/DL
HEMOGLOBIN: 12.4 G/DL (ref 14–18)
HGB BLD-MCNC: 12.4 G/DL (ref 14–18)
IRON SERPL-MCNC: 34 UG/DL (ref 42–175)
LDLC SERPL CALC-MCNC: 55 MG/DL
LDLC SERPL CALC-MCNC: 55 MG/DL
MCH RBC QN AUTO: 31.6 PG (ref 27–34)
MCH RBC QN AUTO: 31.6 PG (ref 27–34)
MCHC RBC AUTO-ENTMCNC: 31.9 G/DL (ref 32–36)
MCHC RBC AUTO-ENTMCNC: 31.9 G/DL (ref 32–36)
MCV RBC AUTO: 99 FL (ref 80–100)
MCV RBC AUTO: 99 FL (ref 80–100)
PLATELET # BLD AUTO: 276 THOU/UL (ref 140–440)
PLATELET # BLD AUTO: 276 THOU/UL (ref 140–440)
PMV BLD AUTO: 10.3 FL (ref 8.5–12.5)
POTASSIUM BLD-SCNC: 4.6 MMOL/L (ref 3.5–5)
POTASSIUM SERPL-SCNC: 4.6 MMOL/L (ref 3.5–5)
PROT SERPL-MCNC: 5.2 G/DL (ref 6–8)
PROT SERPL-MCNC: 5.2 G/DL (ref 6–8)
RBC # BLD AUTO: 3.92 MILL/UL (ref 4.4–6.2)
RBC # BLD AUTO: 3.92 MILL/UL (ref 4.4–6.2)
SODIUM SERPL-SCNC: 135 MMOL/L (ref 135–145)
SODIUM SERPL-SCNC: 135 MMOL/L (ref 136–145)
TRIGL SERPL-MCNC: 181 MG/DL
TRIGL SERPL-MCNC: 181 MG/DL
TSH SERPL DL<=0.005 MIU/L-ACNC: 1.37 UIU/ML (ref 0.3–5)
TSH SERPL-ACNC: 1.37 UIU/ML (ref 0.3–5)
WBC # BLD AUTO: 7.8 THOU/UL (ref 4–11)
WBC: 7.8 THOU/UL (ref 4–11)

## 2018-08-30 LAB — HBA1C MFR BLD: 7.4 % (ref 4.2–6.1)

## 2018-09-07 ENCOUNTER — TELEPHONE (OUTPATIENT)
Dept: FAMILY MEDICINE | Facility: CLINIC | Age: 83
End: 2018-09-07

## 2018-09-07 NOTE — TELEPHONE ENCOUNTER
Sara, [facility] [phone].    Wound care orders needed.     Wound care one time per week X 3 weeks and 2 prn's. Have been doing, needs extension. Order expires tomorrow. Sara says Dr. Reyes is ordering physician. Patient primary is [facility] at [facility]. Please also see previous order request. Margoth Galindo.

## 2018-09-07 NOTE — TELEPHONE ENCOUNTER
Sara VILCHIS home care nurse calling and advised Dr. Reyes is ok all wound care orders.    Sara states understanding.    Nida Burrell RN  EP Triage

## 2018-09-11 DIAGNOSIS — I70.90 ASVD (ARTERIOSCLEROTIC VASCULAR DISEASE): Primary | ICD-10-CM

## 2018-09-11 RX ORDER — PRAVASTATIN SODIUM 10 MG
40 TABLET ORAL DAILY
Qty: 31 TABLET | Refills: 11 | Status: SHIPPED | OUTPATIENT
Start: 2018-09-11

## 2018-09-11 RX ORDER — METOPROLOL SUCCINATE 100 MG/1
TABLET, EXTENDED RELEASE ORAL
Qty: 62 TABLET | Refills: 98 | OUTPATIENT
Start: 2018-09-11

## 2018-09-11 RX ORDER — GABAPENTIN 100 MG/1
100 CAPSULE ORAL 2 TIMES DAILY
Qty: 62 CAPSULE | Refills: 11 | Status: ON HOLD | OUTPATIENT
Start: 2018-09-11 | End: 2018-10-20

## 2018-09-11 RX ORDER — SERTRALINE HYDROCHLORIDE 25 MG/1
200 TABLET, FILM COATED ORAL DAILY
Qty: 31 TABLET | Refills: 11 | Status: SHIPPED | OUTPATIENT
Start: 2018-09-11

## 2018-09-11 RX ORDER — AMOXICILLIN 500 MG
CAPSULE ORAL
Qty: 31 CAPSULE | Refills: 98 | OUTPATIENT
Start: 2018-09-11

## 2018-09-11 RX ORDER — ALLOPURINOL 100 MG/1
100 TABLET ORAL DAILY
Qty: 31 TABLET | Refills: 11 | Status: SHIPPED | OUTPATIENT
Start: 2018-09-11

## 2018-09-11 RX ORDER — CHOLECALCIFEROL (VITAMIN D3) 25 MCG
TABLET ORAL
Qty: 62 TABLET | Refills: 98 | OUTPATIENT
Start: 2018-09-11

## 2018-09-11 RX ORDER — ASPIRIN 325 MG
TABLET ORAL
Qty: 31 TABLET | Refills: 98 | OUTPATIENT
Start: 2018-09-11

## 2018-09-11 RX ORDER — I-VITE, TAB 1000-60-2MG (60/BT) 300MCG-200
TAB ORAL
Qty: 62 TABLET | Refills: 98 | OUTPATIENT
Start: 2018-09-11

## 2018-09-11 RX ORDER — UBIDECARENONE 200 MG
CAPSULE ORAL
Qty: 31 CAPSULE | Refills: 98 | OUTPATIENT
Start: 2018-09-11

## 2018-09-11 RX ORDER — ALLOPURINOL 100 MG/1
TABLET ORAL
Qty: 31 TABLET | Refills: 98 | OUTPATIENT
Start: 2018-09-11

## 2018-09-11 RX ORDER — METOPROLOL SUCCINATE 25 MG/1
100 TABLET, EXTENDED RELEASE ORAL 2 TIMES DAILY
Qty: 62 TABLET | Refills: 11 | Status: ON HOLD | OUTPATIENT
Start: 2018-09-11 | End: 2018-10-20

## 2018-09-11 RX ORDER — MULTIPLE VITAMINS W/ MINERALS TAB 9MG-400MCG
1 TAB ORAL DAILY
Qty: 31 EACH | Refills: 11 | Status: SHIPPED | OUTPATIENT
Start: 2018-09-11

## 2018-09-11 RX ORDER — ROSIGLITAZONE MALEATE 4 MG
TABLET ORAL
Qty: 31 TABLET | Refills: 98 | OUTPATIENT
Start: 2018-09-11

## 2018-09-11 RX ORDER — TICAGRELOR 90 MG/1
TABLET ORAL
Qty: 62 TABLET | Refills: 98 | OUTPATIENT
Start: 2018-09-11

## 2018-09-11 RX ORDER — MULTIVITAMIN WITH FOLIC ACID 400 MCG
TABLET ORAL
Qty: 31 TABLET | Refills: 98 | OUTPATIENT
Start: 2018-09-11

## 2018-09-11 RX ORDER — AMOXICILLIN 500 MG
1200 CAPSULE ORAL DAILY
Qty: 31 CAPSULE | Refills: 11 | Status: SHIPPED | OUTPATIENT
Start: 2018-09-11

## 2018-09-11 RX ORDER — PRAVASTATIN SODIUM 40 MG
TABLET ORAL
Qty: 31 TABLET | Refills: 98 | OUTPATIENT
Start: 2018-09-11

## 2018-09-11 RX ORDER — UBIDECARENONE 200 MG
200 CAPSULE ORAL DAILY
Qty: 31 CAPSULE | Refills: 11 | Status: SHIPPED | OUTPATIENT
Start: 2018-09-11

## 2018-09-11 RX ORDER — VITS A,C,E/LUTEIN/MINERALS 300MCG-200
1 TABLET ORAL 2 TIMES DAILY
Qty: 31 EACH | Refills: 11 | Status: SHIPPED | OUTPATIENT
Start: 2018-09-11

## 2018-09-11 RX ORDER — SERTRALINE HYDROCHLORIDE 100 MG/1
TABLET, FILM COATED ORAL
Refills: 11 | OUTPATIENT
Start: 2018-09-11

## 2018-09-12 RX ORDER — ASPIRIN 325 MG
TABLET ORAL
Qty: 38 TABLET | Refills: 98 | OUTPATIENT
Start: 2018-09-12

## 2018-09-12 NOTE — TELEPHONE ENCOUNTER
Decreasing dose d/t medication interaction with Brilinta.         Maintenance doses of aspirin above 100 mg reduce the effectiveness of ticagrelor and should be avoided.

## 2018-09-17 ENCOUNTER — TELEPHONE (OUTPATIENT)
Dept: FAMILY MEDICINE | Facility: CLINIC | Age: 83
End: 2018-09-17

## 2018-09-17 NOTE — TELEPHONE ENCOUNTER
Ara Garcia [facility] homecare verbal orders for one time  visit.    Verbal orders given.    Lyn Boyd RN  [facility]

## 2018-09-18 ENCOUNTER — ASSISTED LIVING VISIT (OUTPATIENT)
Dept: FAMILY MEDICINE | Facility: CLINIC | Age: 83
End: 2018-09-18
Payer: COMMERCIAL

## 2018-09-18 ENCOUNTER — TELEPHONE (OUTPATIENT)
Dept: FAMILY MEDICINE | Facility: CLINIC | Age: 83
End: 2018-09-18

## 2018-09-18 DIAGNOSIS — Z53.9 ERRONEOUS ENCOUNTER--DISREGARD: Primary | ICD-10-CM

## 2018-09-18 NOTE — TELEPHONE ENCOUNTER
Home home care - [facility] wound care     Asking for verba order for wound care for pt's right second toe     Wash with diluted Hibiclens, antibiotic ointment and cover with Vaseline gauze and then cover with a dressing     Gave verbal ok     Tamiko Telles RN, BSN  [facility]

## 2018-09-18 NOTE — MR AVS SNAPSHOT
"              After Visit Summary   2018    Cecil Martinez    MRN: [id number]           Patient Information     Date Of Birth          1930        Visit Information        Provider Department      2018 12:06 PM Devon Reyes MD [facility] Diagnoses     ERRONEOUS ENCOUNTER--DISREGARD    -  1       Follow-ups after your visit        Who to contact     If you have questions or need follow up information about today's clinic visit or your schedule please contact [facility] directly at [phone].  Normal or non-critical lab and imaging results will be communicated to you by [employer], letter or phone within 4 business days after the clinic has received the results. If you do not hear from us within 7 days, please contact the clinic through [employer] or phone. If you have a critical or abnormal lab result, we will notify you by phone as soon as possible.  Submit refill requests through [employer] or call your pharmacy and they will forward the refill request to us. Please allow 3 business days for your refill to be completed.          Additional Information About Your Visit        MyChart Information     [employer] lets you send messages to your doctor, view your test results, renew your prescriptions, schedule appointments and more. To sign up, go to [email or web address] . Click on \"Log in\" on the left side of the screen, which will take you to the Welcome page. Then click on \"Sign up Now\" on the right side of the page.     You will be asked to enter the access code listed below, as well as some personal information. Please follow the directions to create your username and password.     Your access code is: [email or web address]  Expires: 2018 12:05 PM     Your access code will  in 90 days. If you need help or a new code, please call your [facility] or [phone].        Care EveryWhere ID     This is your Care EveryWhere ID. This could be used by " other organizations to access your [place] medical records  [id number]         Blood Pressure from Last 3 Encounters:   08/28/18 159/71   08/07/18 140/70    Weight from Last 3 Encounters:   08/28/18 218 lb (98.9 kg)   08/07/18 214 lb (97.1 kg)              Today, you had the following     No orders found for display       Primary Care Provider Office Phone # Fax #    [facility]ila[facility]CE[facility] [phone]       [address]  [place] [address]        Equal Access to Services     [facility]: Hadii aad ku hadasho Soomaali, waaxda luqadaha, qaybta kaalmada adeegyada, waxmartinez wilson ademalu patterson . So [facility] [phone].    ATENCIÓN: Si habla español, tiene a cruz disposición servicios gratuitos de asistencia lingüística. [facility] [phone].    We comply with applicable federal civil rights laws and [place] laws. We do not discriminate on the basis of race, color, national origin, age, disability, sex, sexual orientation, or gender identity.            Thank you!     Thank you for choosing [facility]  for your care. Our goal is always to provide you with excellent care. Hearing back from our patients is one way we can continue to improve our services. Please take a few minutes to complete the written survey that you may receive in the mail after your visit with us. Thank you!             Your Updated Medication List - Protect others around you: Learn how to safely use, store and throw away your medicines at www.disposemymeds.org.          This list is accurate as of 9/18/18 11:52 AM.  Always use your most recent med list.                   Brand Name Dispense Instructions for use Diagnosis    allopurinol 100 MG tablet    ZYLOPRIM    31 tablet    Take 1 tablet (100 mg) by mouth daily    Hx of gout       ARTIFICIAL TEAR OP      Apply 1 drop to eye 4 times daily as needed To both eyes qid prn for dry/itchy eyes.        aspirin 81 MG tablet     90 tablet    Take 1 tablet  (81 mg) by mouth daily    ASVD (arteriosclerotic vascular disease)       cholecalciferol 1000 UNIT tablet    vitamin D3    30 tablet    Take 1 tablet (1,000 Units) by mouth 2 times daily    Vitamin D deficiency       coenzyme Q-10 200 MG Caps     31 capsule    Take 200 mg by mouth daily    ASCVD (arteriosclerotic cardiovascular disease)       diclofenac 1 % Gel topical gel    VOLTAREN     Place onto the skin 4 times daily Apply to right wrist small layer TD        EMOLLIENT EX      Externally apply topically 2 times daily To dry patch right hand index finger        fish Oil 1200 MG capsule     31 capsule    Take 1 capsule (1,200 mg) by mouth daily    ASCVD (arteriosclerotic cardiovascular disease)       gabapentin 100 MG capsule    NEURONTIN    62 capsule    Take 1 capsule (100 mg) by mouth 2 times daily    Anxiety       MAPAP PO      Take 650 mg by mouth every 4 hours as needed for mild pain or fever        metFORMIN 500 MG tablet    GLUCOPHAGE    62 tablet    Take 1 tablet (500 mg) by mouth 2 times daily (with meals)    Type 2 diabetes mellitus with other specified complication, unspecified long term insulin use status (H)       metoprolol succinate 25 MG 24 hr tablet    TOPROL-XL    62 tablet    Take 4 tablets (100 mg) by mouth 2 times daily    Essential hypertension       * multivitamin Tabs tablet     31 each    Take 1 tablet by mouth 2 times daily    Takes dietary supplements       * multivitamin, therapeutic with minerals Tabs tablet     31 each    Take 1 tablet by mouth daily    Takes dietary supplements       ONDANSETRON PO      Take 4 mg by mouth every 4 hours as needed for nausea        polyethylene glycol 3350 Powd      17 g daily as needed        pravastatin 10 MG tablet    PRAVACHOL    31 tablet    Take 4 tablets (40 mg) by mouth daily    ASCVD (arteriosclerotic cardiovascular disease)       rosiglitazone 4 MG tablet    AVANDIA    31 tablet    Take 1 tablet (4 mg) by mouth daily    Type 2 diabetes  mellitus with other specified complication, unspecified long term insulin use status (H)       sertraline 25 MG tablet    ZOLOFT    31 tablet    Take 8 tablets (200 mg) by mouth daily    Anxiety, Depression, unspecified depression type       ticagrelor 90 MG tablet    BRILINTA    62 tablet    Take 1 tablet (90 mg) by mouth 2 times daily    ASCVD (arteriosclerotic cardiovascular disease)       timolol 0.5 % ophthalmic solution    TIMOPTIC     Place 1 drop into both eyes 2 times daily        * Notice:  This list has 2 medication(s) that are the same as other medications prescribed for you. Read the directions carefully, and ask your doctor or other care provider to review them with you.

## 2018-09-20 ENCOUNTER — ASSISTED LIVING VISIT (OUTPATIENT)
Dept: FAMILY MEDICINE | Facility: CLINIC | Age: 83
End: 2018-09-20
Payer: COMMERCIAL

## 2018-09-20 DIAGNOSIS — Z53.9 ERRONEOUS ENCOUNTER--DISREGARD: Primary | ICD-10-CM

## 2018-09-20 NOTE — MR AVS SNAPSHOT
"              After Visit Summary   2018    Cecil Martinez    MRN: [id number]           Patient Information     Date Of Birth          1930        Visit Information        Provider Department      2018 12:04 PM Devon Reyes MD [facility]'s Diagnoses     ERRONEOUS ENCOUNTER--DISREGARD    -  1       Follow-ups after your visit        Who to contact     If you have questions or need follow up information about today's clinic visit or your schedule please contact [facility] directly at [phone].  Normal or non-critical lab and imaging results will be communicated to you by "[employer]hart, letter or phone within 4 business days after the clinic has received the results. If you do not hear from us within 7 days, please contact the clinic through "[employer]hart or phone. If you have a critical or abnormal lab result, we will notify you by phone as soon as possible.  Submit refill requests through [employer] or call your pharmacy and they will forward the refill request to us. Please allow 3 business days for your refill to be completed.          Additional Information About Your Visit        MyChart Information     [employer] lets you send messages to your doctor, view your test results, renew your prescriptions, schedule appointments and more. To sign up, go to [email or web address] . Click on \"Log in\" on the left side of the screen, which will take you to the Welcome page. Then click on \"Sign up Now\" on the right side of the page.     You will be asked to enter the access code listed below, as well as some personal information. Please follow the directions to create your username and password.     Your access code is: [email or web address]  Expires: 2018 12:05 PM     Your access code will  in 90 days. If you need help or a new code, please call your [facility] or [phone].        Care EveryWhere ID     This is your Care EveryWhere ID. This could be used by " other organizations to access your [place] medical records  [id number]         Blood Pressure from Last 3 Encounters:   08/28/18 159/71   08/07/18 140/70    Weight from Last 3 Encounters:   08/28/18 218 lb (98.9 kg)   08/07/18 214 lb (97.1 kg)              Today, you had the following     No orders found for display       Primary Care Provider Office Phone # Fax #    Leil[facility] Aguila[facility]UCENA [phone]       [address]  [place] [address]        Equal Access to Services     [facility]: Hadii aad ku hadasho Soomaali, waaxda luqadaha, qaybta kaalmada adeegyada, waxmartinez wilson ademalu patterson . So [facility] [phone].    ATENCIÓN: Si habla español, tiene a cruz disposición servicios gratuitos de asistencia lingüística. [facility] [phone].    We comply with applicable federal civil rights laws and [place] laws. We do not discriminate on the basis of race, color, national origin, age, disability, sex, sexual orientation, or gender identity.            Thank you!     Thank you for choosing [facility]  for your care. Our goal is always to provide you with excellent care. Hearing back from our patients is one way we can continue to improve our services. Please take a few minutes to complete the written survey that you may receive in the mail after your visit with us. Thank you!             Your Updated Medication List - Protect others around you: Learn how to safely use, store and throw away your medicines at www.disposemymeds.org.          This list is accurate as of 9/17/18 12:05 PM.  Always use your most recent med list.                   Brand Name Dispense Instructions for use Diagnosis    allopurinol 100 MG tablet    ZYLOPRIM    31 tablet    Take 1 tablet (100 mg) by mouth daily    Hx of gout       ARTIFICIAL TEAR OP      Apply 1 drop to eye 4 times daily as needed To both eyes qid prn for dry/itchy eyes.        aspirin 81 MG tablet     90 tablet    Take 1 tablet  (81 mg) by mouth daily    ASVD (arteriosclerotic vascular disease)       cholecalciferol 1000 UNIT tablet    vitamin D3    30 tablet    Take 1 tablet (1,000 Units) by mouth 2 times daily    Vitamin D deficiency       coenzyme Q-10 200 MG Caps     31 capsule    Take 200 mg by mouth daily    ASCVD (arteriosclerotic cardiovascular disease)       diclofenac 1 % Gel topical gel    VOLTAREN     Place onto the skin 4 times daily Apply to right wrist small layer TD        EMOLLIENT EX      Externally apply topically 2 times daily To dry patch right hand index finger        fish Oil 1200 MG capsule     31 capsule    Take 1 capsule (1,200 mg) by mouth daily    ASCVD (arteriosclerotic cardiovascular disease)       gabapentin 100 MG capsule    NEURONTIN    62 capsule    Take 1 capsule (100 mg) by mouth 2 times daily    Anxiety       MAPAP PO      Take 650 mg by mouth every 4 hours as needed for mild pain or fever        metFORMIN 500 MG tablet    GLUCOPHAGE    62 tablet    Take 1 tablet (500 mg) by mouth 2 times daily (with meals)    Type 2 diabetes mellitus with other specified complication, unspecified long term insulin use status (H)       metoprolol succinate 25 MG 24 hr tablet    TOPROL-XL    62 tablet    Take 4 tablets (100 mg) by mouth 2 times daily    Essential hypertension       * multivitamin Tabs tablet     31 each    Take 1 tablet by mouth 2 times daily    Takes dietary supplements       * multivitamin, therapeutic with minerals Tabs tablet     31 each    Take 1 tablet by mouth daily    Takes dietary supplements       ONDANSETRON PO      Take 4 mg by mouth every 4 hours as needed for nausea        polyethylene glycol 3350 Powd      17 g daily as needed        pravastatin 10 MG tablet    PRAVACHOL    31 tablet    Take 4 tablets (40 mg) by mouth daily    ASCVD (arteriosclerotic cardiovascular disease)       rosiglitazone 4 MG tablet    AVANDIA    31 tablet    Take 1 tablet (4 mg) by mouth daily    Type 2 diabetes  mellitus with other specified complication, unspecified long term insulin use status (H)       sertraline 25 MG tablet    ZOLOFT    31 tablet    Take 8 tablets (200 mg) by mouth daily    Anxiety, Depression, unspecified depression type       ticagrelor 90 MG tablet    BRILINTA    62 tablet    Take 1 tablet (90 mg) by mouth 2 times daily    ASCVD (arteriosclerotic cardiovascular disease)       timolol 0.5 % ophthalmic solution    TIMOPTIC     Place 1 drop into both eyes 2 times daily        * Notice:  This list has 2 medication(s) that are the same as other medications prescribed for you. Read the directions carefully, and ask your doctor or other care provider to review them with you.

## 2018-09-25 ENCOUNTER — ASSISTED LIVING VISIT (OUTPATIENT)
Dept: FAMILY MEDICINE | Facility: CLINIC | Age: 83
End: 2018-09-25
Payer: COMMERCIAL

## 2018-09-25 VITALS
HEART RATE: 59 BPM | DIASTOLIC BLOOD PRESSURE: 78 MMHG | TEMPERATURE: 96.1 F | RESPIRATION RATE: 14 BRPM | WEIGHT: 200 LBS | SYSTOLIC BLOOD PRESSURE: 151 MMHG | BODY MASS INDEX: 29.53 KG/M2

## 2018-09-25 DIAGNOSIS — I73.9 PVD (PERIPHERAL VASCULAR DISEASE) (H): ICD-10-CM

## 2018-09-25 DIAGNOSIS — I10 BENIGN ESSENTIAL HYPERTENSION: ICD-10-CM

## 2018-09-25 DIAGNOSIS — N18.4 CKD (CHRONIC KIDNEY DISEASE) STAGE 4, GFR 15-29 ML/MIN (H): ICD-10-CM

## 2018-09-25 DIAGNOSIS — E78.5 HYPERLIPIDEMIA LDL GOAL <70: ICD-10-CM

## 2018-09-25 DIAGNOSIS — I25.10 ASCVD (ARTERIOSCLEROTIC CARDIOVASCULAR DISEASE): ICD-10-CM

## 2018-09-25 DIAGNOSIS — F01.53 MULTI-INFARCT DEMENTIA WITH DEPRESSION (H): Primary | ICD-10-CM

## 2018-09-25 DIAGNOSIS — I65.23 BILATERAL CAROTID ARTERY OCCLUSION: ICD-10-CM

## 2018-09-25 DIAGNOSIS — Z51.81 MEDICATION MONITORING ENCOUNTER: ICD-10-CM

## 2018-09-25 DIAGNOSIS — M19.90 INFLAMMATORY ARTHRITIS: ICD-10-CM

## 2018-09-25 DIAGNOSIS — E11.8 TYPE 2 DIABETES MELLITUS WITH COMPLICATION, WITHOUT LONG-TERM CURRENT USE OF INSULIN (H): ICD-10-CM

## 2018-09-25 DIAGNOSIS — F43.21 ADJUSTMENT DISORDER WITH DEPRESSED MOOD: ICD-10-CM

## 2018-09-25 DIAGNOSIS — Z86.73 H/O: CVA (CEREBROVASCULAR ACCIDENT): ICD-10-CM

## 2018-09-25 DIAGNOSIS — Z87.39 H/O: GOUT: ICD-10-CM

## 2018-09-25 NOTE — MR AVS SNAPSHOT
After Visit Summary   9/25/2018    Cecil Martinez    MRN: [id number]           Patient Information     Date Of Birth          7/8/1930        Visit Information        Provider Department      9/25/2018 1:56 PM Devon Reyes MD [facility]        Today's Diagnoses     Multi-infarct dementia with depression    -  1    H/O: CVA (cerebrovascular accident)        Adjustment disorder with depressed mood        ASCVD (arteriosclerotic cardiovascular disease)        Bilateral carotid artery occlusion        PVD (peripheral vascular disease) (H)        CKD (chronic kidney disease) stage 4, 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      Who to contact     If you have questions or need follow up information about today's clinic visit or your schedule please contact [facility] directly at [phone].  Normal or non-critical lab and imaging results will be communicated to you by [employer], letter or phone within 4 business days after the clinic has received the results. If you do not hear from us within 7 days, please contact the clinic through [employer] or phone. If you have a critical or abnormal lab result, we will notify you by phone as soon as possible.  Submit refill requests through [employer] or call your pharmacy and they will forward the refill request to us. Please allow 3 business days for your refill to be completed.          Additional Information About Your Visit        iLive Information     iLive lets you send messages to your doctor, view  "your test results, renew your prescriptions, schedule appointments and more. To sign up, go to [email or web address] . Click on \"Log in\" on the left side of the screen, which will take you to the Welcome page. Then click on \"Sign up Now\" on the right side of the page.     You will be asked to enter the access code listed below, as well as some personal information. Please follow the directions to create your username and password.     Your access code is: [email or web address]  Expires: 2018 12:05 PM     Your access code will  in 90 days. If you need help or a new code, please call your [place] clinic or [phone].        Care EveryWhere ID     This is your Care EveryWhere ID. This could be used by other organizations to access your [facility] medical records  [id number]        Your Vitals Were     Pulse Temperature Respirations BMI (Body Mass Index)          59 96.1  F (35.6  C) 14 29.53 kg/m2         Blood Pressure from Last 3 Encounters:   18 151/78   18 159/71   18 140/70    Weight from Last 3 Encounters:   18 200 lb (90.7 kg)   18 218 lb (98.9 kg)   18 214 lb (97.1 kg)              Today, you had the following     No orders found for display       Primary Care Provider Office Phone # Fax #    Leilani Anisa Aguila [phone]       [address]  [place] [address]        Equal Access to Services     [facility]: Hadii aad ku hadasho Soomaali, waaxda luqadaha, qaybta kaalmada adeegyaoskar, jazmín patterson . So [facility] [phone].    ATENCIÓN: Si habla español, tiene a cruz disposición servicios gratuitos de asistencia lingüística. Llame al [phone].    We comply with applicable federal civil rights laws and [place] laws. We do not discriminate on the basis of race, color, national origin, age, disability, sex, sexual orientation, or gender identity.            Thank you!     Thank you for choosing [facility]  for your care. Our goal is always to provide you with excellent care. Hearing back from our patients is one way we can continue to improve our services. Please take a few minutes to complete the written survey that you may receive in the mail after your visit with us. Thank you!             Your Updated Medication List - Protect others around you: Learn how to safely use, store and throw away your medicines at www.disposemymeds.org.          This list is accurate as of 9/25/18 11:59 PM.  Always use your most recent med list.                   Brand Name Dispense Instructions for use Diagnosis    allopurinol 100 MG tablet    ZYLOPRIM    31 tablet    Take 1 tablet (100 mg) by mouth daily    Hx of gout       ARTIFICIAL TEAR OP      Apply 1 drop to eye 4 times daily as needed To both eyes qid prn for dry/itchy eyes.        aspirin 81 MG tablet     90 tablet    Take 1 tablet (81 mg) by mouth daily    ASVD (arteriosclerotic vascular disease)       cholecalciferol 1000 UNIT tablet    vitamin D3    30 tablet    Take 1 tablet (1,000 Units) by mouth 2 times daily    Vitamin D deficiency       coenzyme Q-10 200 MG Caps     31 capsule    Take 200 mg by mouth daily    ASCVD (arteriosclerotic cardiovascular disease)       diclofenac 1 % Gel topical gel    VOLTAREN     Place onto the skin 4 times daily Apply to right wrist small layer TD        EMOLLIENT EX      Externally apply topically 2 times daily To dry patch right hand index finger        fish Oil 1200 MG capsule     31 capsule    Take 1 capsule (1,200 mg) by mouth daily    ASCVD (arteriosclerotic cardiovascular disease)       gabapentin 100 MG capsule    NEURONTIN    62 capsule    Take 1 capsule (100 mg) by mouth 2 times daily    Anxiety       MAPAP PO      Take 650 mg by mouth every 4 hours as needed for mild pain or fever        metFORMIN 500 MG tablet    GLUCOPHAGE    62 tablet    Take 1 tablet (500 mg) by mouth 2 times daily (with meals)    Type 2 diabetes  mellitus with other specified complication, unspecified long term insulin use status (H)       metoprolol succinate 25 MG 24 hr tablet    TOPROL-XL    62 tablet    Take 4 tablets (100 mg) by mouth 2 times daily    Essential hypertension       * multivitamin Tabs tablet     31 each    Take 1 tablet by mouth 2 times daily    Takes dietary supplements       * multivitamin, therapeutic with minerals Tabs tablet     31 each    Take 1 tablet by mouth daily    Takes dietary supplements       polyethylene glycol 3350 Powd      17 g daily as needed        pravastatin 10 MG tablet    PRAVACHOL    31 tablet    Take 4 tablets (40 mg) by mouth daily    ASCVD (arteriosclerotic cardiovascular disease)       rosiglitazone 4 MG tablet    AVANDIA    31 tablet    Take 1 tablet (4 mg) by mouth daily    Type 2 diabetes mellitus with other specified complication, unspecified long term insulin use status (H)       sertraline 25 MG tablet    ZOLOFT    31 tablet    Take 8 tablets (200 mg) by mouth daily    Anxiety, Depression, unspecified depression type       ticagrelor 90 MG tablet    BRILINTA    62 tablet    Take 1 tablet (90 mg) by mouth 2 times daily    ASCVD (arteriosclerotic cardiovascular disease)       timolol 0.5 % ophthalmic solution    TIMOPTIC     Place 1 drop into both eyes 2 times daily        * Notice:  This list has 2 medication(s) that are the same as other medications prescribed for you. Read the directions carefully, and ask your doctor or other care provider to review them with you.

## 2018-09-25 NOTE — PROGRESS NOTES
[facility]                      PRIMARY CARE PROVIDER AND CLINIC:      Leilani Aguila [address] / [place] [address]    Devon Reyes MD FAAFP with Leilani Aguila NP    SUBJECTIVE:                                                      Chief Complaint   Patient presents with     RECHECK     MD Intial       HPI:    Cecil Martinez is a 88 year old  (7/8/1930),resident of    [facility]    Initial/Episodic Care:    Current issues are:         Moved into memory care with wife, from [place], [place], [place]    Wants out, extensive past history review.    Stable no other concerns, slow adjustment    Patient's living condition: lives in memory care with wife    Medications reviewed and reconciled:  yes  Medications taken as prescribed:  yes  Medications side effects noted:  None known    Current Activity/ADL Level:  At baseline, limited activity, minimal CVA residual, except dementia    Any fall's in last year ?  unknown    Advance Directives ?  See POLST     Appetite:  Good, notes food bad  Sleep: no concerns  Bowels:  No concerns  Bladder:  No concerns  Incontinence:  None known    Immunizations:  reviewed    Health Maintenance    Health Maintenance Due   Topic Date Due     SPIROMETRY ONETIME  07/08/1930     COPD ACTION PLAN Q1 YR  08/08/1930     FOOT EXAM Q1 YEAR  07/08/1931     EYE EXAM Q1 YEAR  07/08/1931     MICROALBUMIN Q1 YEAR  07/08/1931     TETANUS IMMUNIZATION (SYSTEM ASSIGNED)  07/08/1948     DEPRESSION ACTION PLAN Q1 YR  07/08/1948     PHQ-9 Q6 MONTHS  07/08/1948     FALL RISK ASSESSMENT  07/08/1995     INFLUENZA VACCINE (1) 09/01/2018       Current Problem List    Patient Active Problem List   Diagnosis     Benign essential hypertension     H/O: gout     Inflammatory arthritis     Type 2 diabetes mellitus with complication, without long-term current use of insulin (H)     PVD (peripheral vascular disease) (H)     Adjustment disorder with  depressed mood     Multi-infarct dementia with depression     H/O: CVA (cerebrovascular accident)     Bilateral carotid artery occlusion     ASCVD (arteriosclerotic cardiovascular disease)     CKD (chronic kidney disease) stage 4, 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)     Hyperlipidemia LDL goal <70       Past Medical History    Past Medical History:   Diagnosis Date     Adjustment disorder with depressed mood 8/13/2018     ASCVD (arteriosclerotic cardiovascular disease) 8/13/2018     Benign essential hypertension 8/13/2018     Bilateral carotid artery occlusion 8/13/2018     Carotid stenosis     L>R, severe     CKD (chronic kidney disease) stage 4, 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) 8/13/2018     CVA (cerebral vascular accident) (H) 4/18 to 7/18    multiple - minimal residual?     Diabetes mellitus (H)      H/O: CVA (cerebrovascular accident) 8/13/2018     H/O: gout 8/13/2018     Hyperlipidemia LDL goal <70 9/30/2018     Inflammatory arthritis 8/13/2018     Multi-infarct dementia with depression 8/13/2018     PVD (peripheral vascular disease) (H)      Type 2 diabetes mellitus with complication, without long-term current use of insulin (H) 8/13/2018       Past Surgical History    Past Surgical History:   Procedure Laterality Date     KNEE SURGERY       STROKE OR TIA >120 DAYS CEA  05/02/2018       Current Medications    Current Outpatient Prescriptions   Medication Sig Dispense Refill     Acetaminophen (MAPAP PO) Take 650 mg by mouth every 4 hours as needed for mild pain or fever       allopurinol (ZYLOPRIM) 100 MG tablet Take 1 tablet (100 mg) by mouth daily 31 tablet 11     ARTIFICIAL TEAR OP Apply 1 drop to eye 4 times daily as needed To both eyes qid prn for dry/itchy eyes.       aspirin 81 MG tablet Take 1 tablet (81 mg) by mouth daily 90 tablet 3     cholecalciferol (VITAMIN D3) 1000 UNIT tablet Take 1 tablet (1,000 Units) by mouth 2 times daily 30 tablet 11     coenzyme Q-10 200 MG CAPS Take 200 mg by mouth daily 31 capsule 11      diclofenac (VOLTAREN) 1 % GEL topical gel Place onto the skin 4 times daily Apply to right wrist small layer TD       EMOLLIENT EX Externally apply topically 2 times daily To dry patch right hand index finger       gabapentin (NEURONTIN) 100 MG capsule Take 1 capsule (100 mg) by mouth 2 times daily 62 capsule 11     metFORMIN (GLUCOPHAGE) 500 MG tablet Take 1 tablet (500 mg) by mouth 2 times daily (with meals) 62 tablet 11     metoprolol succinate (TOPROL-XL) 25 MG 24 hr tablet Take 4 tablets (100 mg) by mouth 2 times daily 62 tablet 11     multivitamin (OCUVITE) TABS tablet Take 1 tablet by mouth 2 times daily 31 each 11     multivitamin, therapeutic with minerals (THERA-VIT-M) TABS tablet Take 1 tablet by mouth daily 31 each 11     Omega-3 Fatty Acids (FISH OIL) 1200 MG capsule Take 1 capsule (1,200 mg) by mouth daily 31 capsule 11     polyethylene glycol 3350 POWD 17 g daily as needed       pravastatin (PRAVACHOL) 10 MG tablet Take 4 tablets (40 mg) by mouth daily 31 tablet 11     rosiglitazone (AVANDIA) 4 MG tablet Take 1 tablet (4 mg) by mouth daily 31 tablet 11     sertraline (ZOLOFT) 25 MG tablet Take 8 tablets (200 mg) by mouth daily 31 tablet 11     ticagrelor (BRILINTA) 90 MG tablet Take 1 tablet (90 mg) by mouth 2 times daily 62 tablet 11     timolol (TIMOPTIC) 0.5 % ophthalmic solution Place 1 drop into both eyes 2 times daily       order for DME Equipment being ordered: Glucerna/Protein Supplement  Take 1-3 caner per day  each 11       Allergies    Allergies   Allergen Reactions     Hmg-Coa-R Inhibitors      Severe, myalgias 7/17/18.     Sitagliptin      Severe , Hives 7/17/18.       Immunizations    Immunization History   Administered Date(s) Administered     DT (PEDS <7y) 10/16/2012     FLU 6-35 months 10/21/2014     Influenza (High Dose) 3 valent vaccine 09/10/2013, 11/06/2015, 11/13/2015, 10/13/2017     Influenza (IIV3) PF 11/07/2008     Pneumo Conj 13-V (2010&after) 05/07/2016      Pneumococcal 23 valent 10/13/2017     Zoster vaccine, live 05/01/2012       Family History    Family History   Problem Relation Age of Onset     Cerebrovascular Disease Father        Social History    Social History     Social History     Marital status:      Spouse name: N/A     Number of children: N/A     Years of education: N/A     Occupational History     Not on file.     Social History Main Topics     Smoking status: Former Smoker     Types: Pipe     Smokeless tobacco: Not on file      Comment: 25-50 pks/year     Alcohol use Not on file     Drug use: Not on file     Sexual activity: Not on file     Other Topics Concern     Not on file     Social History Narrative       All above reviewed and updated, all stable unless otherwise noted    Recent labs reviewed    ROS:  As above, otherwise negative    OBJECTIVE:                                                    /78  Pulse 59  Temp 96.1  F (35.6  C)  Resp 14  Wt 200 lb (90.7 kg)  BMI 29.53 kg/m2  Body mass index is 29.53 kg/(m^2).    BP Readings from Last 3 Encounters:   09/22/18 151/78   08/28/18 159/71   08/07/18 140/70       Wt Readings from Last 4 Encounters:   09/22/18 200 lb (90.7 kg)   08/28/18 218 lb (98.9 kg)   08/07/18 214 lb (97.1 kg)       GENERAL: healthy, alert and no distress  EYES: Eyes grossly normal to inspection, extraocular movements - intact, and PERRL  NECK: no tenderness, no adenopathy, no asymmetry, no masses, no stiffness; thyroid- normal to palpation  RESP: lungs clear to auscultation - no rales, no rhonchi, no wheezes  CV: regular rates and rhythm, normal S1 S2, no S3 or S4 and 1/6 systolic murmur, no click or rub -  ABDOMEN: soft, no tenderness, no  hepatosplenomegaly, no masses, normal bowel sounds  MS: extremities- no gross deformities noted, no edema  SKIN: no suspicious lesions, no rashes  NEURO: non focal  PSYCH: at baseline, irritable, memory decline    DIAGNOSTICS/PROCEDURES:                                                       Lab/Diagnostic data:    WBC   Date Value Ref Range Status   08/29/2018 7.8 4.0 - 11.0 thou/uL Final       Hemoglobin   Date Value Ref Range Status   08/29/2018 12.4 (A) 14.0 - 18.0 g/dL Final       Last Basic Metabolic Panel:    Lab Results   Component Value Date     08/29/2018      Lab Results   Component Value Date    POTASSIUM 4.6 08/29/2018     Lab Results   Component Value Date    GRISEL 8.5 08/29/2018     Lab Results   Component Value Date    CO2 26 08/29/2018       Lab Results   Component Value Date    BUN 17 08/29/2018     Lab Results   Component Value Date    CR 1.17 08/29/2018       Lab Results   Component Value Date     08/29/2018       Recent labs reviewed     ASSESSMENT/PLAN:                                                        ICD-10-CM    1. Multi-infarct dementia with depression F01.51     F32.9    2. H/O: CVA (cerebrovascular accident) Z86.73    3. Adjustment disorder with depressed mood F43.21    4. ASCVD (arteriosclerotic cardiovascular disease) I25.10    5. Bilateral carotid artery occlusion I65.23    6. PVD (peripheral vascular disease) (H) I73.9    7. CKD (chronic kidney disease) stage 4, 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) N18.4    8. Type 2 diabetes mellitus with complication, without long-term current use of insulin (H) E11.8    9. Benign essential hypertension I10    10. Inflammatory arthritis M19.90    11. H/O: gout Z87.39    12. Hyperlipidemia LDL goal <70 E78.5    13. Medication monitoring encounter Z51.81        Discussed treatment/modality options, including risk and benefits, he desires:     Orders:    Continue current cares.  Watch labs    All diagnosis above reviewed and noted above, otherwise stable.  See I Just Shared orders for further details.      Information reviewed:  Medications, vital signs, orders, nursing notes, problem list, hospital information.     Facility MDS and care plan reviewed.    Total time spent with patient visit was 40 minutes including patient visit  and review of past records. Greater than 50% of total time spent with counseling and coordinating care.      Follow up in 4-6 months month(s) and as needed.    FACE TO FACE:                                                      Documentation of Face to Face and Certification for Home Health Services    I certify that patient: Cecil Martinez is under my care and that I, or a nurse practitioner or physician's assistant working with me, had a face-to-face encounter that meets the physician face-to-face encounter requirements with this patient on: 9/25/2018.    This encounter with the patient was in whole, or in part, for the following medical condition, which is the primary reason for home health care: dementia, hx of CVA, DM, PVD, HTN, lipidsCAD, Carotid stenosis, CKD.    I certify that, based on my findings, the following services are medically necessary home health services: per memory care evaluation.    My clinical findings support the need for the above services because: as above    Further, I certify that my clinical findings support that this patient is homebound (i.e. absences from home require considerable and taxing effort and are for medical reasons or [employer] orth[employer] services or infrequently or of short duration when for other reasons) because: safety and fall concerns.    Based on the above findings. I certify that this patient is confined to the home and needs intermittent skilled nursing care, physical therapy and/or speech therapy.  The patient is under my care, and I have initiated the establishment of the plan of care.  This patient will be followed by a physician who will periodically review the plan of care.  Physician/Provider to provide follow up care: Leilani Aguila    Attending hospital physician (the Medicare certified PECOS provider): Devon Reyes  Physician Signature: See electronic signature associated with these discharge orders.  Date: 9/25/2018           Devon Reyes MD, [facility]  [facility]iatric Services  [address]  [address]    [phone] Fax    With Leilani Aguila NP

## 2018-09-26 ENCOUNTER — TELEPHONE (OUTPATIENT)
Dept: FAMILY MEDICINE | Facility: CLINIC | Age: 83
End: 2018-09-26

## 2018-09-26 DIAGNOSIS — I73.9 PVD (PERIPHERAL VASCULAR DISEASE) (H): ICD-10-CM

## 2018-09-26 DIAGNOSIS — E11.8 TYPE 2 DIABETES MELLITUS WITH COMPLICATION, WITHOUT LONG-TERM CURRENT USE OF INSULIN (H): Primary | ICD-10-CM

## 2018-09-26 NOTE — TELEPHONE ENCOUNTER
Order printed, signed by MD THAO and faxed to # below  Called [facility] @ # below - left  advising of MD THAO message      Yesenia Ugalde RN  [facility]

## 2018-09-26 NOTE — TELEPHONE ENCOUNTER
Jeannie RN with  Home Care, calling    Requesting verbal order:   To add some collagen to R second toe wound (verbal given)    Would like patient started on Glucerna or some other protein supplement to help facilitate wound healing  Order can be faxed to Attn: [facility] in [place] @ [phone]    Jeannie Ph. [phone]    Routing to PCP for further review/recommendations/orders.  Order pended for approval      Yesenia Ugalde RN  [facility]

## 2018-09-30 PROBLEM — E78.5 HYPERLIPIDEMIA LDL GOAL <70: Status: ACTIVE | Noted: 2018-09-30

## 2018-10-10 ENCOUNTER — APPOINTMENT (OUTPATIENT)
Dept: CT IMAGING | Facility: CLINIC | Age: 83
DRG: 057 | End: 2018-10-10
Attending: EMERGENCY MEDICINE
Payer: MEDICARE

## 2018-10-10 ENCOUNTER — HOSPITAL ENCOUNTER (INPATIENT)
Facility: CLINIC | Age: 83
LOS: 9 days | Discharge: ANOTHER HOSPITAL | DRG: 057 | End: 2018-10-21
Attending: EMERGENCY MEDICINE | Admitting: INTERNAL MEDICINE
Payer: MEDICARE

## 2018-10-10 ENCOUNTER — APPOINTMENT (OUTPATIENT)
Dept: GENERAL RADIOLOGY | Facility: CLINIC | Age: 83
DRG: 057 | End: 2018-10-10
Attending: EMERGENCY MEDICINE
Payer: MEDICARE

## 2018-10-10 DIAGNOSIS — F03.91 DEMENTIA WITH BEHAVIORAL DISTURBANCE, UNSPECIFIED DEMENTIA TYPE: ICD-10-CM

## 2018-10-10 DIAGNOSIS — R46.89 AGGRESSIVE BEHAVIOR OF ADULT: ICD-10-CM

## 2018-10-10 DIAGNOSIS — E11.8 TYPE 2 DIABETES MELLITUS WITH COMPLICATION, WITHOUT LONG-TERM CURRENT USE OF INSULIN (H): Primary | ICD-10-CM

## 2018-10-10 DIAGNOSIS — I10 ESSENTIAL HYPERTENSION: ICD-10-CM

## 2018-10-10 PROBLEM — R45.1 AGITATION: Status: ACTIVE | Noted: 2018-10-10

## 2018-10-10 LAB
ALBUMIN UR-MCNC: 100 MG/DL
ANION GAP SERPL CALCULATED.3IONS-SCNC: 6 MMOL/L (ref 3–14)
APPEARANCE UR: CLEAR
BASOPHILS # BLD AUTO: 0.1 10E9/L (ref 0–0.2)
BASOPHILS NFR BLD AUTO: 0.7 %
BILIRUB UR QL STRIP: NEGATIVE
BUN SERPL-MCNC: 20 MG/DL (ref 7–30)
CALCIUM SERPL-MCNC: 8.5 MG/DL (ref 8.5–10.1)
CHLORIDE SERPL-SCNC: 101 MMOL/L (ref 94–109)
CO2 SERPL-SCNC: 30 MMOL/L (ref 20–32)
COLOR UR AUTO: YELLOW
CREAT SERPL-MCNC: 1.25 MG/DL (ref 0.66–1.25)
DIFFERENTIAL METHOD BLD: ABNORMAL
EOSINOPHIL # BLD AUTO: 1 10E9/L (ref 0–0.7)
EOSINOPHIL NFR BLD AUTO: 11.1 %
ERYTHROCYTE [DISTWIDTH] IN BLOOD BY AUTOMATED COUNT: 14.1 % (ref 10–15)
GFR SERPL CREATININE-BSD FRML MDRD: 54 ML/MIN/1.7M2
GLUCOSE BLDC GLUCOMTR-MCNC: 158 MG/DL (ref 70–99)
GLUCOSE SERPL-MCNC: 178 MG/DL (ref 70–99)
GLUCOSE UR STRIP-MCNC: NEGATIVE MG/DL
HBA1C MFR BLD: 7.7 % (ref 0–5.6)
HCT VFR BLD AUTO: 40.8 % (ref 40–53)
HGB BLD-MCNC: 13.2 G/DL (ref 13.3–17.7)
HGB UR QL STRIP: NEGATIVE
HYALINE CASTS #/AREA URNS LPF: 1 /LPF (ref 0–2)
IMM GRANULOCYTES # BLD: 0.1 10E9/L (ref 0–0.4)
IMM GRANULOCYTES NFR BLD: 0.8 %
INR PPP: 1.02 (ref 0.86–1.14)
KETONES UR STRIP-MCNC: NEGATIVE MG/DL
LEUKOCYTE ESTERASE UR QL STRIP: NEGATIVE
LYMPHOCYTES # BLD AUTO: 1.4 10E9/L (ref 0.8–5.3)
LYMPHOCYTES NFR BLD AUTO: 15.7 %
MCH RBC QN AUTO: 31.6 PG (ref 26.5–33)
MCHC RBC AUTO-ENTMCNC: 32.4 G/DL (ref 31.5–36.5)
MCV RBC AUTO: 98 FL (ref 78–100)
MONOCYTES # BLD AUTO: 0.9 10E9/L (ref 0–1.3)
MONOCYTES NFR BLD AUTO: 10 %
MUCOUS THREADS #/AREA URNS LPF: PRESENT /LPF
NEUTROPHILS # BLD AUTO: 5.5 10E9/L (ref 1.6–8.3)
NEUTROPHILS NFR BLD AUTO: 61.7 %
NITRATE UR QL: NEGATIVE
NRBC # BLD AUTO: 0 10*3/UL
NRBC BLD AUTO-RTO: 0 /100
PH UR STRIP: 6 PH (ref 5–7)
PLATELET # BLD AUTO: 296 10E9/L (ref 150–450)
POTASSIUM SERPL-SCNC: 4.4 MMOL/L (ref 3.4–5.3)
RBC # BLD AUTO: 4.18 10E12/L (ref 4.4–5.9)
RBC #/AREA URNS AUTO: 1 /HPF (ref 0–2)
SODIUM SERPL-SCNC: 137 MMOL/L (ref 133–144)
SOURCE: ABNORMAL
SP GR UR STRIP: 1.01 (ref 1–1.03)
SQUAMOUS #/AREA URNS AUTO: 1 /HPF (ref 0–1)
UROBILINOGEN UR STRIP-MCNC: 0 MG/DL (ref 0–2)
WBC # BLD AUTO: 8.8 10E9/L (ref 4–11)
WBC #/AREA URNS AUTO: 2 /HPF (ref 0–5)

## 2018-10-10 PROCEDURE — 85610 PROTHROMBIN TIME: CPT | Performed by: EMERGENCY MEDICINE

## 2018-10-10 PROCEDURE — 25000128 H RX IP 250 OP 636: Performed by: EMERGENCY MEDICINE

## 2018-10-10 PROCEDURE — 36415 COLL VENOUS BLD VENIPUNCTURE: CPT | Performed by: EMERGENCY MEDICINE

## 2018-10-10 PROCEDURE — 72220 X-RAY EXAM SACRUM TAILBONE: CPT

## 2018-10-10 PROCEDURE — G0378 HOSPITAL OBSERVATION PER HR: HCPCS

## 2018-10-10 PROCEDURE — 96372 THER/PROPH/DIAG INJ SC/IM: CPT

## 2018-10-10 PROCEDURE — 81001 URINALYSIS AUTO W/SCOPE: CPT | Performed by: EMERGENCY MEDICINE

## 2018-10-10 PROCEDURE — 00000146 ZZHCL STATISTIC GLUCOSE BY METER IP

## 2018-10-10 PROCEDURE — 70450 CT HEAD/BRAIN W/O DYE: CPT

## 2018-10-10 PROCEDURE — 80048 BASIC METABOLIC PNL TOTAL CA: CPT | Performed by: EMERGENCY MEDICINE

## 2018-10-10 PROCEDURE — 99220 ZZC INITIAL OBSERVATION CARE,LEVL III: CPT | Performed by: INTERNAL MEDICINE

## 2018-10-10 PROCEDURE — 85025 COMPLETE CBC W/AUTO DIFF WBC: CPT | Performed by: EMERGENCY MEDICINE

## 2018-10-10 PROCEDURE — 83036 HEMOGLOBIN GLYCOSYLATED A1C: CPT | Performed by: EMERGENCY MEDICINE

## 2018-10-10 PROCEDURE — 99285 EMERGENCY DEPT VISIT HI MDM: CPT | Mod: 25

## 2018-10-10 RX ORDER — LORAZEPAM 2 MG/ML
0.5 INJECTION INTRAMUSCULAR ONCE
Status: DISCONTINUED | OUTPATIENT
Start: 2018-10-10 | End: 2018-10-10

## 2018-10-10 RX ORDER — ACETAMINOPHEN 650 MG/1
650 SUPPOSITORY RECTAL EVERY 4 HOURS PRN
Status: DISCONTINUED | OUTPATIENT
Start: 2018-10-10 | End: 2018-10-21 | Stop reason: HOSPADM

## 2018-10-10 RX ORDER — ACETAMINOPHEN 325 MG/1
650 TABLET ORAL EVERY 4 HOURS PRN
Status: DISCONTINUED | OUTPATIENT
Start: 2018-10-10 | End: 2018-10-21 | Stop reason: HOSPADM

## 2018-10-10 RX ORDER — OLANZAPINE 10 MG/2ML
2.5 INJECTION, POWDER, FOR SOLUTION INTRAMUSCULAR ONCE
Status: DISCONTINUED | OUTPATIENT
Start: 2018-10-10 | End: 2018-10-17 | Stop reason: CLARIF

## 2018-10-10 RX ORDER — NALOXONE HYDROCHLORIDE 0.4 MG/ML
.1-.4 INJECTION, SOLUTION INTRAMUSCULAR; INTRAVENOUS; SUBCUTANEOUS
Status: DISCONTINUED | OUTPATIENT
Start: 2018-10-10 | End: 2018-10-21 | Stop reason: HOSPADM

## 2018-10-10 RX ORDER — ONDANSETRON 4 MG/1
4 TABLET, ORALLY DISINTEGRATING ORAL EVERY 6 HOURS PRN
Status: DISCONTINUED | OUTPATIENT
Start: 2018-10-10 | End: 2018-10-21 | Stop reason: HOSPADM

## 2018-10-10 RX ORDER — GABAPENTIN 100 MG/1
100 CAPSULE ORAL 2 TIMES DAILY
Status: DISCONTINUED | OUTPATIENT
Start: 2018-10-10 | End: 2018-10-15

## 2018-10-10 RX ORDER — HALOPERIDOL 5 MG/ML
5 INJECTION INTRAMUSCULAR ONCE
Status: COMPLETED | OUTPATIENT
Start: 2018-10-10 | End: 2018-10-10

## 2018-10-10 RX ORDER — HALOPERIDOL 5 MG/ML
5 INJECTION INTRAMUSCULAR ONCE
Status: DISCONTINUED | OUTPATIENT
Start: 2018-10-10 | End: 2018-10-10

## 2018-10-10 RX ORDER — QUETIAPINE FUMARATE 25 MG/1
25 TABLET, FILM COATED ORAL 2 TIMES DAILY
Status: DISCONTINUED | OUTPATIENT
Start: 2018-10-10 | End: 2018-10-14

## 2018-10-10 RX ORDER — ACETAMINOPHEN 325 MG/1
650 TABLET ORAL EVERY 4 HOURS PRN
Status: DISCONTINUED | OUTPATIENT
Start: 2018-10-10 | End: 2018-10-10

## 2018-10-10 RX ORDER — ONDANSETRON 2 MG/ML
4 INJECTION INTRAMUSCULAR; INTRAVENOUS EVERY 6 HOURS PRN
Status: DISCONTINUED | OUTPATIENT
Start: 2018-10-10 | End: 2018-10-21 | Stop reason: HOSPADM

## 2018-10-10 RX ORDER — ASPIRIN 81 MG/1
81 TABLET, CHEWABLE ORAL DAILY
Status: DISCONTINUED | OUTPATIENT
Start: 2018-10-11 | End: 2018-10-21 | Stop reason: HOSPADM

## 2018-10-10 RX ORDER — METOPROLOL SUCCINATE 25 MG/1
25 TABLET, EXTENDED RELEASE ORAL 2 TIMES DAILY
Status: DISCONTINUED | OUTPATIENT
Start: 2018-10-10 | End: 2018-10-14

## 2018-10-10 RX ORDER — LORAZEPAM 2 MG/ML
1 INJECTION INTRAMUSCULAR ONCE
Status: COMPLETED | OUTPATIENT
Start: 2018-10-10 | End: 2018-10-10

## 2018-10-10 RX ORDER — HALOPERIDOL 5 MG/ML
2 INJECTION INTRAMUSCULAR EVERY 6 HOURS PRN
Status: DISCONTINUED | OUTPATIENT
Start: 2018-10-10 | End: 2018-10-11

## 2018-10-10 RX ORDER — DEXTROSE MONOHYDRATE 25 G/50ML
25-50 INJECTION, SOLUTION INTRAVENOUS
Status: DISCONTINUED | OUTPATIENT
Start: 2018-10-10 | End: 2018-10-21 | Stop reason: HOSPADM

## 2018-10-10 RX ORDER — NICOTINE POLACRILEX 4 MG
15-30 LOZENGE BUCCAL
Status: DISCONTINUED | OUTPATIENT
Start: 2018-10-10 | End: 2018-10-21 | Stop reason: HOSPADM

## 2018-10-10 RX ADMIN — LORAZEPAM 1 MG: 2 INJECTION INTRAMUSCULAR; INTRAVENOUS at 17:23

## 2018-10-10 RX ADMIN — HALOPERIDOL LACTATE 5 MG: 5 INJECTION INTRAMUSCULAR at 16:21

## 2018-10-10 RX ADMIN — LORAZEPAM 1 MG: 2 INJECTION INTRAMUSCULAR; INTRAVENOUS at 15:27

## 2018-10-10 ASSESSMENT — ENCOUNTER SYMPTOMS: SHORTNESS OF BREATH: 0
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
## 2018-10-10 NOTE — IP AVS SNAPSHOT
` `     [place] [address] MEDICAL SURGICAL: [phone]            Medication Administration Report for Cecil Martinez as of 10/21/18 1112   Legend:    Given Hold Not Given Due Canceled Entry Other Actions    Time Time (Time) Time  Time-Action       Inactive    Active    Linked        Medications 10/15/18 10/16/18 10/17/18 10/18/18 10/19/18 10/20/18 10/21/18    acetaminophen (TYLENOL) Suppository 650 mg  Dose: 650 mg  Freq: EVERY 4 HOURS PRN Route: RE  PRN Reason: mild pain  Start: 10/10/18 1922   Admin Instructions: Alternate ibuprofen (if ordered) with acetaminophen.  Maximum acetaminophen dose from all sources = 75 mg/kg/day not to exceed 4 grams/day.    Admin. Amount: 1 suppository (1 × 650 mg suppository)  Dispense Loc:  [facility]               acetaminophen (TYLENOL) tablet 650 mg  Dose: 650 mg  Freq: EVERY 4 HOURS PRN Route: PO  PRN Reason: mild pain  Start: 10/10/18 1922   Admin Instructions: Alternate ibuprofen (if ordered) with acetaminophen.  Maximum acetaminophen dose from all sources = 75 mg/kg/day not to exceed 4 grams/day.    Admin. Amount: 2 tablet (2 × 325 mg tablet)  Last Admin: 10/18/18 2141  Dispense Loc: [facility]      0907 (650 mg)-Given         1637 (650 mg)-Given       2141 (650 mg)-Given              aspirin chewable tablet 81 mg  Dose: 81 mg  Freq: DAILY Route: PO  Start: 10/11/18 0900   Admin. Amount: 1 tablet (1 × 81 mg tablet)  Last Admin: 10/21/18 0955  Dispense Loc: [facility]     0917 (81 mg)-Given        0908 (81 mg)-Given        0959 (81 mg)-Given        0828 (81 mg)-Given        0825 (81 mg)-Given        0844 (81 mg)-Given        0955 (81 mg)-Given           cholecalciferol (vitamin D3) tablet 1,000 Units  Dose: 1,000 Units  Freq: 2 TIMES DAILY Route: PO  Start: 10/11/18 1000   Admin. Amount: 1 tablet (1 × 1,000 Units tablet)  Last Admin: 10/21/18 0955  Dispense Loc:  [facility]     0930 (1,000 Units)-Given       2129 (1,000 Units)-Given        0908 (1,000 Units)-Given        (2035)-Not Given [C]        0959 (1,000 Units)-Given       2040 (1,000 Units)-Given        0828 (1,000 Units)-Given       2142 (1,000 Units)-Given        0825 (1,000 Units)-Given       (2221)-Not Given        0844 (1,000 Units)-Given       2019 (1,000 Units)-Given        0955 (1,000 Units)-Given       [ ] 2100           diclofenac (VOLTAREN) 1 % topical gel 2 g  Dose: 2 g  Freq: 4 TIMES DAILY Route: Top  Start: 10/11/18 1000   Admin Instructions: Apply to joints  Send dosing card with product.    Admin. Amount: 2 g  Last Admin: 10/21/18 0956  Dispense Loc:  Main Pharmacy     (1209)-Not Given       (1210)-Not Given       (1733)-Not Given       (2118)-Not Given        0918 (2 g)-Given       1213 (2 g)-Given       (2008)-Not Given       (2145)-Not Given        (1003)-Not Given       (1336)-Not Given       1703 (2 g)-Given       (2111)-Not Given        (0833)-Not Given       (1232)-Not Given       1746 (2 g)-Given       2140 (2 g)-Given        0837 (2 g)-Given       1305 (2 g)-Given       1723 (2 g)-Given       (2226)-Not Given        (0853)-Not Given       (1252)-Not Given [C]       (1711)-Not Given [C]       (2153)-Not Given        0956 (2 g)-Given [C]       [ ] 1300       [ ] 1800       [ ] 2200           gabapentin (NEURONTIN) capsule 200 mg  Dose: 200 mg  Freq: 3 TIMES DAILY Route: PO  Start: 10/15/18 1115   Admin. Amount: 2 capsule (2 × 100 mg capsule)  Last Admin: 10/21/18 0954  Dispense Loc:  [facility]     1207 (200 mg)-Given       1722 (200 mg)-Given       2125 (200 mg)-Given        0907 (200 mg)-Given       (1613)-Not Given       (2145)-Not Given [C]        0959 (200 mg)-Given       1703 (200 mg)-Given       2111 (200 mg)-Given        0828 (200 mg)-Given       1637 (200 mg)-Given       2140 (200 mg)-Given        0826 (200 mg)-Given       1559 (200 mg)-Given       (2222)-Not Given       2337 (200 mg)-Given        0844 (200 mg)-Given       1705 (200 mg)-Given       2018 (200 mg)-Given               0954  (200 mg)-Given       [ ] 1600       [ ] 2200           glucose gel 15-30 g  Dose: 15-30 g  Freq: EVERY 15 MIN PRN Route: PO  PRN Reason: low blood sugar  Start: 10/10/18 2017   Admin Instructions: Give 15 g for BG 51 to 69 mg/dL IF patient is conscious and able to swallow. Give 30 g for BG less than or equal to 50 mg/dL IF patient is conscious and able to swallow. Do NOT give glucose gel via enteral tube.  IF patient has enteral tube: give apple juice 120 mL (4 oz or 15 g of CHO) via enteral tube for BG 51 to 69 mg/dL.  Give apple juice 240 mL (8 oz or 30 g of CHO) via enteral tube for BG less than or equal to 50 mg/dL.    ~Oral gel is preferable for conscious and able to swallow patient.   ~IF gel unavailable or patient refuses may provide apple juice 120 mL (4 oz or 15 g of CHO). Document juice on I and O flowsheet.    Admin. Amount: 15-30 g  Dispense Loc: [facility]  Volume: 93.75 mL              Or  dextrose 50 % injection 25-50 mL  Dose: 25-50 mL  Freq: EVERY 15 MIN PRN Route: IV  PRN Reason: low blood sugar  Start: 10/10/18 2017   Admin Instructions: Use if have IV access, BG less than 70 mg/dL and meet dose criteria below:  Dose if conscious and alert (or disorientated) and NPO = 25 mL  Dose if unconscious / not alert = 50 mL  Vesicant. For ordered doses up to 25 g, give IV Push undiluted. Give each 5g over 1 minute.    Admin. Amount: 25-50 mL  Dispense Loc: [facility]  Infused Over: 1-5 Minutes  Volume: 50 mL              Or  glucagon injection 1 mg  Dose: 1 mg  Freq: EVERY 15 MIN PRN Route: SC  PRN Reason: low blood sugar  PRN Comment: May repeat x 1 only  Start: 10/10/18 2017   Admin Instructions: May give SQ or IM. ONLY use glucagon IF patient has NO IV access AND is UNABLE to swallow AND blood glucose is LESS than or EQUAL to 50 mg/dL.  If ordered IV, give IV Push over 1 minute. Reconstitute with 1mL sterile water.    Admin. Amount: 1 mg  Dispense Loc: [facility]               haloperidol (HALDOL)  tablet 2 mg  Dose: 2 mg  Freq: 2 TIMES DAILY Route: PO  Start: 10/17/18 1315   Admin. Amount: 4 tablet (4 × 0.5 mg tablet)  Last Admin: 10/21/18 0954  Dispense Loc: [facility]       1337 (2 mg)-Given       2111 (2 mg)-Given        0828 (2 mg)-Given       2140 (2 mg)-Given        0827 (2 mg)-Given       (2223)-Not Given       2337 (2 mg)-Given        0843 (2 mg)-Given       2018 (2 mg)-Given        0954 (2 mg)-Given       [ ] 2100           haloperidol lactate (HALDOL) injection 2 mg  Dose: 2 mg  Freq: EVERY 6 HOURS PRN Route: IV/IM  PRN Reason: agitation  Start: 10/17/18 0651   Admin Instructions: For ordered doses up to 5 mg, drug can be give IV Push undiluted over 1 minute.    Admin. Amount: 2 mg = 0.4 mL Conc: 5 mg/mL  Last Admin: 10/17/18 0808  Dispense Loc: [facility]  Infused Over: 1 Minutes  Volume: 1 mL       0808 (2 mg)-Given               insulin aspart (NovoLOG) inj (RAPID ACTING)  Dose: 1-5 Units  Freq: AT BEDTIME Route: SC  Start: 10/10/18 2200   Admin Instructions: MEDIUM INSULIN RESISTANCE DOSING    Do Not give Bedtime Correction Insulin if BG less than  200.   For  - 249 give 1 units.   For  - 299 give 2 units.   For  - 349 give 3 units.   For  -399 give 4 units.   For BG greater than or equal to 400 give 5 units.  Notify provider if glucose greater than or equal to 350 mg/dL after administration of correction dose.  If given at mealtime, administer within 30 minutes of start of meal    Admin. Amount: 1-5 Units  Dispense Loc: Contact Rx for dose  Volume: 3 mL     (2116)-Not Given [C]        (2146)-Not Given [C]        2114-Hold        (2204)-Not Given [C]        (2227)-Not Given        (2222)-Not Given [C]        [ ] 2200           insulin aspart (NovoLOG) inj (RAPID ACTING)  Dose: 1-7 Units  Freq: 3 TIMES DAILY BEFORE MEALS Route: SC  Start: 10/11/18 0730   Admin Instructions: Correction Scale - MEDIUM INSULIN RESISTANCE DOSING     Do Not give Correction Insulin if Pre-Meal  BG less than 140.   For Pre-Meal  - 189 give 1 unit.   For Pre-Meal  - 239 give 2 units.   For Pre-Meal  - 289 give 3 units.   For Pre-Meal  - 339 give 4 units.   For Pre-Meal - 399 give 5 units.   For Pre-Meal -449 give 6 units  For Pre-Meal BG greater than or equal to 450 give 7 units.   To be given with prandial insulin, and based on pre-meal blood glucose.    Notify provider if glucose greater than or equal to 350 mg/dL after administration of correction dose.  If given at mealtime, administer within 30 minutes of start of meal    Admin. Amount: 1-7 Units  Last Admin: 10/20/18 1705  Dispense Loc: Contact Rx for dose  Volume: 3 mL     (0905)-Not Given [C]       (1210)-Not Given       1834 (1 Units)-Given [C]        0915 (2 Units)-Given       (1214)-Not Given [C]       (2009)-Not Given [C]        (0927)-Not Given       1341 (1 Units)-Given [C]       1711 (3 Units)-Given        (0834)-Not Given [C]       1254 (1 Units)-Given [C]       1934 (1 Units)-Given [C]        0839 (1 Units)-Given       (1305)-Not Given       1700 (1 Units)-Given [C]        (1006)-Not Given       (1252)-Not Given       1705 (2 Units)-Given        (0743)-Not Given [C]       [ ] 1200       [ ] 1700           melatonin tablet 5 mg  Dose: 5 mg  Freq: 2 TIMES DAILY Route: PO  Start: 10/10/18 2100   Admin. Amount: 2 tablet (2 × 1 mg tablet) + 1 tablet (1 × 3 mg tablet)  Last Admin: 10/20/18 2019  Dispense Loc:  [facility]   Mixture Administration Information:   Medication Type Amount   melatonin 1 MG TABS Medications 2 mg   melatonin 3 MG TABS Medications 3 mg             1207 (5 mg)-Given       2130 (5 mg)-Given        1306 (5 mg)-Given       (2035)-Not Given [C]        1337 (5 mg)-Given       2039 (5 mg)-Given        1300 (5 mg)-Given       2141 (5 mg)-Given        1304 (5 mg)-Given       (2223)-Not Given       2336 (5 mg)-Given        1345 (5 mg)-Given       2019 (5 mg)-Given        [ ] 1300       [ ] 2100            metFORMIN (GLUCOPHAGE) tablet 500 mg  Dose: 500 mg  Freq: 2 TIMES DAILY WITH MEALS Route: PO  Start: 10/14/18 1800   Admin Instructions: If the patient receives intravenous, iodinated contrast and patient GFR is greater than 60 mL/min/1.7m2, continue metformin.  Contact provider for 'hold' or 'no hold' instructions if no GFR or if GFR is less than 60 mL/min/1.7m2.    Admin. Amount: 1 tablet (1 × 500 mg tablet)  Last Admin: 10/21/18 0955  Dispense Loc:  [facility]     0917 (500 mg)-Given       1722 (500 mg)-Given        0908 (500 mg)-Given       (1711)-Not Given        0959 (500 mg)-Given       1703 (500 mg)-Given        1232-Hold [C]       1746 (500 mg)-Given        0827 (500 mg)-Given       1702 (500 mg)-Given        0844 (500 mg)-Given       1704 (500 mg)-Given        0955 (500 mg)-Given       [ ] 1800           metoprolol succinate (TOPROL-XL) 24 hr tablet 75 mg  Dose: 75 mg  Freq: 2 TIMES DAILY Route: PO  Start: 10/20/18 2100   Admin Instructions: DO NOT CRUSH. Tablet may be split in half along score line.  Hold if SBP < 110 or HR <  50    Admin. Amount: 1 tablet (1 × 25 mg tablet) + 1 tablet (1 × 50 mg tablet)  Last Admin: 10/21/18 0954  Dispense Loc:  [facility]   Mixture Administration Information:   Medication Type Amount   metoprolol succinate 25 MG TB24 Medications 25 mg   metoprolol succinate 50 MG TB24 Medications 50 mg                  2019 (75 mg)-Given        0954 (75 mg)-Given       [ ] 2100           mirtazapine (REMERON) tablet 15 mg  Dose: 15 mg  Freq: AT BEDTIME Route: PO  Start: 10/15/18 2200   Admin. Amount: 1 tablet (1 × 15 mg tablet)  Last Admin: 10/20/18 2019  Dispense Loc: [facility]     2127 (15 mg)-Given        (2146)-Not Given [C]        2111 (15 mg)-Given        2143 (15 mg)-Given        (2222)-Not Given       2337 (15 mg)-Given        2019 (15 mg)-Given               [ ] 2200           naloxone (NARCAN) injection 0.1-0.4 mg  Dose: 0.1-0.4 mg  Freq: EVERY 2 MIN PRN Route: IV  PRN  Reason: opioid reversal  Start: 10/10/18 1922   Admin Instructions: For respiratory rate LESS than or EQUAL to 8.  Partial reversal dose:  0.1 mg titrated q 2 minutes for Analgesia Side Effects Monitoring Sedation Level of 3 (frequently drowsy, arousable, drifts to sleep during conversation).Full reversal dose:  0.4 mg bolus for Analgesia Side Effects Monitoring Sedation Level of 4 (somnolent, minimal or no response to stimulation).  For ordered IV doses 0.1-2mg give IVP. Give each 0.4mg over 15 seconds in emergency situations. For non-emergent situations further dilute in 9mL of NS to facilitate titration of response.    Admin. Amount: 0.1-0.4 mg = 0.25-1 mL Conc: 0.4 mg/mL  Dispense Loc: [facility]  Volume: 1 mL               OLANZapine (zyPREXA) tablet 2.5 mg  Dose: 2.5 mg  Freq: EVERY 8 HOURS PRN Route: PO  PRN Comment: agitation  Start: 10/15/18 1107   Admin Instructions: Combined IM and PO doses may significantly increase the risk of orthostatic hypotension at 30 mg per day or higher.    Admin. Amount: 1 tablet (1 × 2.5 mg tablet)  Last Admin: 10/19/18 0413  Dispense Loc: [facility]      (1613)-Not Given        0549 (2.5 mg)-Given        1637 (2.5 mg)-Given        0413 (2.5 mg)-Given             OLANZapine (zyPREXA) tablet 5 mg  Dose: 5 mg  Freq: AT BEDTIME Route: PO  Start: 10/17/18 2200   Admin Instructions: Combined IM and PO doses may significantly increase the risk of orthostatic hypotension at 30 mg per day or higher.    Admin. Amount: 2 tablet (2 × 2.5 mg tablet)  Last Admin: 10/20/18 2018  Dispense Loc: [facility]       2111 (5 mg)-Given        2141 (5 mg)-Given        (2221)-Not Given       2345 (5 mg)-Given        2018 (5 mg)-Given               [ ] 2200           ondansetron (ZOFRAN-ODT) ODT tab 4 mg  Dose: 4 mg  Freq: EVERY 6 HOURS PRN Route: PO  PRN Reasons: nausea,vomiting  Start: 10/10/18 1922   Admin Instructions: This is Step 1 of nausea and vomiting management.  If nausea not resolved in  15 minutes, go to Step 2 prochlorperazine (COMPAZINE). Do not push through foil backing. Peel back foil and gently remove. Place on tongue immediately. Administration with liquid unnecessary  With dry hands, peel back foil backing and gently remove tablet; do not push oral disintegrating tablet through foil backing; administer immediately on tongue and oral disintegrating tablet dissolves in seconds; then swallow with saliva; liquid not required.    Admin. Amount: 1 tablet (1 × 4 mg tablet)  Dispense Loc: [facility]              Or  ondansetron (ZOFRAN) injection 4 mg  Dose: 4 mg  Freq: EVERY 6 HOURS PRN Route: IV  PRN Reasons: nausea,vomiting  Start: 10/10/18 1922   Admin Instructions: This is Step 1 of nausea and vomiting management.  If nausea not resolved in 15 minutes, go to Step 2 prochlorperazine (COMPAZINE).  Irritant. For ordered IV doses 0.1-4 mg, give IV Push undiluted over 2-5 minutes.    Admin. Amount: 4 mg = 2 mL Conc: 4 mg/2 mL  Last Admin: 10/11/18 0737  Dispense Loc: [facility]  Infused Over: 2-5 Minutes  Volume: 2 mL               polyethylene glycol (MIRALAX/GLYCOLAX) Packet 17 g  Dose: 17 g  Freq: DAILY PRN Route: PO  PRN Comment: constipation  Indications Comment: constipation  Start: 10/12/18 1217   Admin Instructions: 1 Packet = 17 grams. Mixed prescribed dose in 8 ounces of water. Follow with 8 oz. of water.    Admin. Amount: 17 g  Last Admin: 10/19/18 1600  Dispense Loc: [facility]         1600 (17 g)-Given             pravastatin (PRAVACHOL) tablet 40 mg  Dose: 40 mg  Freq: DAILY Route: PO  Start: 10/11/18 1000   Admin. Amount: 2 tablet (2 × 20 mg tablet)  Last Admin: 10/21/18 0955  Dispense Loc: [facility]     0917 (40 mg)-Given        0907 (40 mg)-Given        0958 (40 mg)-Given        0827 (40 mg)-Given        0827 (40 mg)-Given        0844 (40 mg)-Given        0955 (40 mg)-Given           sertraline (ZOLOFT) tablet 200 mg  Dose: 200 mg  Freq: DAILY Route: PO  Start: 10/11/18 1300    Admin. Amount: 2 tablet (2 × 100 mg tablet)  Last Admin: 10/20/18 1345  Dispense Loc: [facility]     1207 (200 mg)-Given        1306 (200 mg)-Given        1336 (200 mg)-Given        1300 (200 mg)-Given        1304 (200 mg)-Given        1345 (200 mg)-Given        [ ] 1300           sodium chloride 0.9% infusion  Rate: 100 mL/hr   Freq: CONTINUOUS Route: IV  Last Dose: Stopped (10/21/18 0913)  Start: 10/20/18 0930   Last Admin: 10/20/18 2247  Dispense Loc: [facility] Floor Stock  Volume: 1,000 mL   Current Line: Peripheral IV 10/20/18 Right;Distal Lower forearm         1006 ( )-New Bag       2247 ( )-New Bag        0913-Stopped           ticagrelor (BRILINTA) tablet 90 mg  Dose: 90 mg  Freq: 2 TIMES DAILY Route: PO  Start: 10/10/18 2100   Admin. Amount: 1 tablet (1 × 90 mg tablet)  Last Admin: 10/21/18 0955  Dispense Loc:  [facility]     0917 (90 mg)-Given       2126 (90 mg)-Given        0907 (90 mg)-Given       (2036)-Not Given [C]        1004 (90 mg)-Given       2040 (90 mg)-Given        0827 (90 mg)-Given       2141 (90 mg)-Given        0826 (90 mg)-Given       (2222)-Not Given        0843 (90 mg)-Given       2019 (90 mg)-Given        0955 (90 mg)-Given       [ ] 2100           timolol (TIMOPTIC) 0.5 % ophthalmic solution 1 drop  Dose: 1 drop  Freq: 2 TIMES DAILY Route: Both Eyes  Start: 10/11/18 1000   Admin. Amount: 1 drop  Last Admin: 10/21/18 0956  Dispense Loc:  Main Pharmacy  Volume: 5 mL     (1210)-Not Given       2121 (1 drop)-Given        0917 (1 drop)-Given       (2137)-Not Given [C]        1004 (1 drop)-Given       2039 (1 drop)-Given        (0834)-Not Given       2140 (1 drop)-Given        0837 (1 drop)-Given       (2227)-Not Given        0852 (1 drop)-Given       (2153)-Not Given        0956 (1 drop)-Given       [ ] 2100          Discontinued Medications  Medications 10/15/18 10/16/18 10/17/18 10/18/18 10/19/18 10/20/18 10/21/18         Dose: 100 mg  Freq: 2 TIMES DAILY Route: PO  Start: 10/14/18 2100    End: 10/20/18 0921   Admin Instructions: DO NOT CRUSH. Tablet may be split in half along score line.  Hold if SBP < 110 or HR <  50    Admin. Amount: 1 tablet (1 × 100 mg tablet)  Last Admin: 10/19/18 0826  Dispense Loc:  [facility]     0917 (100 mg)-Given       2128 (100 mg)-Given        0908 (100 mg)-Given       (2047)-Not Given [C]        0959 (100 mg)-Given       2040 (100 mg)-Given        0826 (100 mg)-Given       (2141)-Not Given [C]        0826 (100 mg)-Given       (2222)-Not Given        (0844)-Not Given       0921-Med Discontinued          Dose: 37.5 mg  Freq: EVERY EVENING Route: PO  Start: 10/14/18 2000   End: 10/19/18 0845   Admin. Amount: 1 half-tab (1 × 12.5 mg half-tab) + 1 tablet (1 × 25 mg tablet)  Last Admin: 10/18/18 1933  Dispense Loc:  [facility]   Mixture Administration Information:   Medication Type Amount   QUEtiapine 12.5 mg TABS Medications 12.5 mg   QUEtiapine 25 MG TABS Medications 25 mg             2123 (37.5 mg)-Given        (2009)-Not Given [C]        2040 (37.5 mg)-Given        1933 (37.5 mg)-Given        0845-Med Discontinued           Dose: 25 mg  Freq: EVERY MORNING Route: PO  Start: 10/15/18 0900   End: 10/19/18 0845   Admin. Amount: 1 tablet (1 × 25 mg tablet)  Last Admin: 10/19/18 0826  Dispense Loc:  ADS [facility]     0917 (25 mg)-Given        0908 (25 mg)-Given        0959 (25 mg)-Given        0828 (25 mg)-Given        0826 (25 mg)-Given       0845-Med Discontinued
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
## 2018-10-10 NOTE — IP AVS SNAPSHOT
MRN:[id number]                      After Visit Summary   10/10/2018    Cecil Martinez    MRN: [id number]           Thank you!     Thank you for choosing [facility] for your care. Our goal is always to provide you with excellent care. Hearing back from our patients is one way we can continue to improve our services. Please take a few minutes to complete the written survey that you may receive in the mail after you visit. If you would like to speak to someone directly about your visit please contact Patient Relations at [phone]. Thank you!          Patient Information     Date Of Birth          7/8/1930        Designated Caregiver       Most Recent Value    Caregiver    Will someone help with your care after discharge? yes    Name of designated caregiver [facility]    Phone number of caregiver See  note    Caregiver address See  note      About your hospital stay     You were admitted on:  October 10, 2018 You last received care in the:  [place] [address] [facility]    You were discharged on:  October 21, 2018        Reason for your hospital stay       Agitation                  Who to Call     For medical emergencies, please call 911.  For non-urgent questions about your medical care, please call your primary care provider or clinic, [phone]          Attending Provider     Provider Specialty    Sanaz Grubbs MD Emergency Medicine    Jennie, Eric Arce MD Emergency Medicine    Yamile Toro MD Internal Medicine    Tyesha, Kristina Buchanan MD Internal Medicine       Primary Care Provider Office Phone # Fax #    Leilani Lagne AZUCENA Aguila [phone]      After Care Instructions     Activity - Up ad cristopher           Advance Diet as Tolerated       Follow this diet upon discharge: Orders Placed This Encounter      Snacks/Supplements Adult: Boost Plus; Between Meals      High Consistent CHO Diet            General info for SNF        "Length of Stay Estimate: Short Term Care: Estimated # of Days <30  Condition at Discharge: Stable  Level of care:skilled   Rehabilitation Potential: Fair  Admission H&P remains valid and up-to-date: Yes  Recent Chemotherapy: N/A  Use Nursing Home Standing Orders: Yes            Mantoux instructions       Give two-step Mantoux (PPD) Per Facility Policy Yes                  Follow-up Appointments     Follow Up and recommended labs and tests       Follow up with Nursing home physician.                  Pending Results     No orders found from 10/8/2018 to 10/11/2018.            Statement of Approval     Ordered          10/21/18 0913  I have reviewed and agree with all the recommendations and orders detailed in this document.  EFFECTIVE NOW     Approved and electronically signed by:  Tacho Henley MD             Admission Information     Date & Time Provider Department Dept. Phone    10/10/2018 Kristina Arambula MD [place] [address] [facility] [phone]      Your Vitals Were     Blood Pressure Pulse Temperature Respirations Weight Pulse Oximetry    155/64 55 96.3  F (35.7  C) (Axillary) 20 88.6 kg (195 lb 4 oz) 94%    BMI (Body Mass Index)                   28.83 kg/m2           MyChart Information     [facility] lets you send messages to your doctor, view your test results, renew your prescriptions, schedule appointments and more. To sign up, go to [email or web address] . Click on \"Log in\" on the left side of the screen, which will take you to the Welcome page. Then click on \"Sign up Now\" on the right side of the page.     You will be asked to enter the access code listed below, as well as some personal information. Please follow the directions to create your username and password.     Your access code is: [email or web address]  Expires: 2018 12:05 PM     Your access code will  in 90 days. If you need help or a new code, please call your [facility] or [phone].        Care EveryWhere " ID     This is your Care EveryWhere ID. This could be used by other organizations to access your [facility] medical records  [id number]        Equal Access to Services     JESSICA BRINK : Itzel Acuña, jesús pagan, kizzyterri keithpetaroskar caicedopiooskar, waxmartinez isai marniemaribel caicedomalu izaguirre leonardo villarreal. So [place] [phone].    ATENCIÓN: Si habla español, tiene a cruz disposición servicios gratuitos de asistencia lingüística. Llame al [phone].    We comply with applicable federal civil rights laws and [place] laws. We do not discriminate on the basis of race, color, national origin, age, disability, sex, sexual orientation, or gender identity.               Review of your medicines      START taking        Dose / Directions    haloperidol 2 MG tablet   Commonly known as:  HALDOL   Used for:  Dementia with behavioral disturbance, unspecified dementia type, Aggressive behavior of adult        Dose:  2 mg   Take 1 tablet (2 mg) by mouth 2 times daily   Refills:  0       mirtazapine 15 MG tablet   Commonly known as:  REMERON   Used for:  Aggressive behavior of adult, Dementia with behavioral disturbance, unspecified dementia type        Dose:  15 mg   Take 1 tablet (15 mg) by mouth At Bedtime   Quantity:  30 tablet   Refills:  0       * OLANZapine 2.5 MG tablet   Commonly known as:  zyPREXA   Used for:  Dementia with behavioral disturbance, unspecified dementia type, Aggressive behavior of adult        Dose:  2.5 mg   Take 1 tablet (2.5 mg) by mouth every 8 hours as needed (agitation)   Quantity:  60 tablet   Refills:  0       * OLANZapine 5 MG tablet   Commonly known as:  zyPREXA   Used for:  Dementia with behavioral disturbance, unspecified dementia type, Aggressive behavior of adult        Dose:  5 mg   Take 1 tablet (5 mg) by mouth At Bedtime   Quantity:  30 tablet   Refills:  0       * Notice:  This list has 2 medication(s) that are the same as other medications prescribed for you. Read the directions  carefully, and ask your doctor or other care provider to review them with you.      CONTINUE these medicines which may have CHANGED, or have new prescriptions. If we are uncertain of the size of tablets/capsules you have at home, strength may be listed as something that might have changed.        Dose / Directions    gabapentin 100 MG capsule   Commonly known as:  NEURONTIN   This may have changed:    - how much to take  - when to take this   Used for:  Type 2 diabetes mellitus with complication, without long-term current use of insulin (H)        Dose:  200 mg   Take 2 capsules (200 mg) by mouth 3 times daily   Quantity:  90 capsule   Refills:  0       metoprolol succinate 25 MG 24 hr tablet   Commonly known as:  TOPROL-XL   This may have changed:  how much to take   Used for:  Essential hypertension        Dose:  75 mg   Take 3 tablets (75 mg) by mouth 2 times daily   Quantity:  62 tablet   Refills:  11         CONTINUE these medicines which have NOT CHANGED        Dose / Directions    allopurinol 100 MG tablet   Commonly known as:  ZYLOPRIM   Used for:  Hx of gout        Dose:  100 mg   Take 1 tablet (100 mg) by mouth daily   Quantity:  31 tablet   Refills:  11       ARTIFICIAL TEAR OP        Dose:  1 drop   Apply 1 drop to eye 4 times daily as needed To both eyes qid prn for dry/itchy eyes.   Refills:  0       aspirin 81 MG tablet   Used for:  ASVD (arteriosclerotic vascular disease)        Dose:  81 mg   Take 1 tablet (81 mg) by mouth daily   Quantity:  90 tablet   Refills:  3       cholecalciferol 1000 UNIT tablet   Commonly known as:  vitamin D3   Used for:  Vitamin D deficiency        Dose:  1000 Units   Take 1 tablet (1,000 Units) by mouth 2 times daily   Quantity:  30 tablet   Refills:  11       coenzyme Q-10 200 MG Caps   Used for:  ASCVD (arteriosclerotic cardiovascular disease)        Dose:  200 mg   Take 200 mg by mouth daily   Quantity:  31 capsule   Refills:  11       diclofenac 1 % Gel topical gel    Commonly known as:  VOLTAREN        Place onto the skin 4 times daily Apply to right wrist small layer TD   Refills:  0       EMOLLIENT EX        Externally apply topically 2 times daily To dry patch right hand index finger   Refills:  0       fish Oil 1200 MG capsule   Used for:  ASCVD (arteriosclerotic cardiovascular disease)        Dose:  1200 mg   Take 1 capsule (1,200 mg) by mouth daily   Quantity:  31 capsule   Refills:  11       MAPAP PO   Indication:  Pain        Dose:  650 mg   Take 650 mg by mouth every 4 hours as needed for mild pain or fever   Refills:  0       melatonin 5 MG tablet        Dose:  5 mg   Take 5 mg by mouth 2 times daily at 1pm and bedtime   Refills:  0       metFORMIN 500 MG tablet   Commonly known as:  GLUCOPHAGE   Used for:  Type 2 diabetes mellitus with other specified complication, unspecified long term insulin use status        Dose:  500 mg   Take 1 tablet (500 mg) by mouth 2 times daily (with meals)   Quantity:  62 tablet   Refills:  11       * multivitamin Tabs tablet   Used for:  Takes dietary supplements        Dose:  1 tablet   Take 1 tablet by mouth 2 times daily   Quantity:  31 each   Refills:  11       * multivitamin, therapeutic with minerals Tabs tablet   Used for:  Takes dietary supplements        Dose:  1 tablet   Take 1 tablet by mouth daily   Quantity:  31 each   Refills:  11       order for DME   Used for:  Type 2 diabetes mellitus with complication, without long-term current use of insulin (H), PVD (peripheral vascular disease) (H)        Equipment being ordered: Glucerna/Protein Supplement Take 1-3 caner per day PRN   Quantity:  100 each   Refills:  11       polyethylene glycol 3350 Powd   Indication:  constipation        Dose:  17 g   Take 17 g by mouth daily as needed   Refills:  0       pravastatin 10 MG tablet   Commonly known as:  PRAVACHOL   Used for:  ASCVD (arteriosclerotic cardiovascular disease)        Dose:  40 mg   Take 4 tablets (40 mg) by mouth daily    Quantity:  31 tablet   Refills:  11       rosiglitazone 4 MG tablet   Commonly known as:  AVANDIA   Used for:  Type 2 diabetes mellitus with other specified complication, unspecified long term insulin use status        Dose:  4 mg   Take 1 tablet (4 mg) by mouth daily   Quantity:  31 tablet   Refills:  11       sertraline 25 MG tablet   Commonly known as:  ZOLOFT   Used for:  Anxiety, Depression, unspecified depression type        Dose:  200 mg   Take 8 tablets (200 mg) by mouth daily   Quantity:  31 tablet   Refills:  11       ticagrelor 90 MG tablet   Commonly known as:  BRILINTA   Used for:  ASCVD (arteriosclerotic cardiovascular disease)        Dose:  90 mg   Take 1 tablet (90 mg) by mouth 2 times daily   Quantity:  62 tablet   Refills:  11       timolol 0.5 % ophthalmic solution   Commonly known as:  TIMOPTIC   Used for:  Glaucoma of both eyes, unspecified glaucoma type        INSTILL ONE DROP TO EYE(S) TWICE DAILY   Quantity:  5 mL   Refills:  97       * Notice:  This list has 2 medication(s) that are the same as other medications prescribed for you. Read the directions carefully, and ask your doctor or other care provider to review them with you.         Where to get your medicines      Some of these will need a paper prescription and others can be bought over the counter. Ask your nurse if you have questions.     You don't need a prescription for these medications     gabapentin 100 MG capsule    haloperidol 2 MG tablet    metoprolol succinate 25 MG 24 hr tablet    mirtazapine 15 MG tablet    OLANZapine 2.5 MG tablet    OLANZapine 5 MG tablet                Protect others around you: Learn how to safely use, store and throw away your medicines at www.disposemymeds.org.             Medication List: This is a list of all your medications and when to take them. Check marks below indicate your daily home schedule. Keep this list as a reference.      Medications           Morning Afternoon Evening Bedtime As  Needed    allopurinol 100 MG tablet   Commonly known as:  ZYLOPRIM   Take 1 tablet (100 mg) by mouth daily                                ARTIFICIAL TEAR OP   Apply 1 drop to eye 4 times daily as needed To both eyes qid prn for dry/itchy eyes.                                aspirin 81 MG tablet   Take 1 tablet (81 mg) by mouth daily                                cholecalciferol 1000 UNIT tablet   Commonly known as:  vitamin D3   Take 1 tablet (1,000 Units) by mouth 2 times daily   Last time this was given:  1,000 Units on 10/21/2018  9:55 AM                                coenzyme Q-10 200 MG Caps   Take 200 mg by mouth daily                                diclofenac 1 % Gel topical gel   Commonly known as:  VOLTAREN   Place onto the skin 4 times daily Apply to right wrist small layer TD   Last time this was given:  2 g on 10/21/2018  9:56 AM                                EMOLLIENT EX   Externally apply topically 2 times daily To dry patch right hand index finger                                fish Oil 1200 MG capsule   Take 1 capsule (1,200 mg) by mouth daily                                gabapentin 100 MG capsule   Commonly known as:  NEURONTIN   Take 2 capsules (200 mg) by mouth 3 times daily   Last time this was given:  200 mg on 10/21/2018  9:54 AM                                haloperidol 2 MG tablet   Commonly known as:  HALDOL   Take 1 tablet (2 mg) by mouth 2 times daily   Last time this was given:  2 mg on 10/21/2018  9:54 AM                                MAPAP PO   Take 650 mg by mouth every 4 hours as needed for mild pain or fever   Last time this was given:  650 mg on 10/18/2018  9:41 PM                                melatonin 5 MG tablet   Take 5 mg by mouth 2 times daily at 1pm and bedtime   Last time this was given:  5 mg on 10/20/2018  8:19 PM                                metFORMIN 500 MG tablet   Commonly known as:  GLUCOPHAGE   Take 1 tablet (500 mg) by mouth 2 times daily (with meals)    Last time this was given:  500 mg on 10/21/2018  9:55 AM                                metoprolol succinate 25 MG 24 hr tablet   Commonly known as:  TOPROL-XL   Take 3 tablets (75 mg) by mouth 2 times daily   Last time this was given:  75 mg on 10/21/2018  9:54 AM                                mirtazapine 15 MG tablet   Commonly known as:  REMERON   Take 1 tablet (15 mg) by mouth At Bedtime   Last time this was given:  15 mg on 10/20/2018  8:19 PM                                * multivitamin Tabs tablet   Take 1 tablet by mouth 2 times daily                                * multivitamin, therapeutic with minerals Tabs tablet   Take 1 tablet by mouth daily                                * OLANZapine 2.5 MG tablet   Commonly known as:  zyPREXA   Take 1 tablet (2.5 mg) by mouth every 8 hours as needed (agitation)   Last time this was given:  5 mg on 10/20/2018  8:18 PM                                * OLANZapine 5 MG tablet   Commonly known as:  zyPREXA   Take 1 tablet (5 mg) by mouth At Bedtime   Last time this was given:  5 mg on 10/20/2018  8:18 PM                                order for DME   Equipment being ordered: Glucerna/Protein Supplement Take 1-3 caner per day PRN                                polyethylene glycol 3350 Powd   Take 17 g by mouth daily as needed                                pravastatin 10 MG tablet   Commonly known as:  PRAVACHOL   Take 4 tablets (40 mg) by mouth daily   Last time this was given:  40 mg on 10/21/2018  9:55 AM                                rosiglitazone 4 MG tablet   Commonly known as:  AVANDIA   Take 1 tablet (4 mg) by mouth daily                                sertraline 25 MG tablet   Commonly known as:  ZOLOFT   Take 8 tablets (200 mg) by mouth daily   Last time this was given:  200 mg on 10/20/2018  1:45 PM                                ticagrelor 90 MG tablet   Commonly known as:  BRILINTA   Take 1 tablet (90 mg) by mouth 2 times daily   Last time this was  given:  90 mg on 10/21/2018  9:55 AM                                timolol 0.5 % ophthalmic solution   Commonly known as:  TIMOPTIC   INSTILL ONE DROP TO EYE(S) TWICE DAILY   Last time this was given:  1 drop on 10/21/2018  9:56 AM                                * Notice:  This list has 4 medication(s) that are the same as other medications prescribed for you. Read the directions carefully, and ask your doctor or other care provider to review them with you.

## 2018-10-10 NOTE — ED NOTES
Pt is noted to be increasing in agitation.  Attempting to strike staff with call light.  Call light removed from patient bed.  Sitter placed in room.  Agitation increasing to shaking bed railings, calling out and attempting to strike staff.  Security called and is standing outside room.  IM Ativan given with assistance of security and 2 RNs.

## 2018-10-10 NOTE — IP AVS SNAPSHOT
"[place] [address] [facility]: [phone]                                              INTERAGENCY TRANSFER FORM - PHYSICIAN ORDERS   10/10/2018                    Hospital Admission Date: 10/10/2018  DULCE MARIA CROFT   : 1930  Sex: Male        Attending Provider: Kristina Arambula MD     Allergies:  Hmg-coa-r Inhibitors, Sitagliptin    Infection:  None   Service:  Observation    Ht:  1.753 m (5' 9\")   Wt:  88.6 kg (195 lb 4 oz)   Admission Wt:  88.6 kg (195 lb 4 oz)    BMI:  28.83 kg/m 2   BSA:  2.08 m 2            Patient PCP Information     Provider PCP Type    Leilani Aguila NP General      ED Clinical Impression     Diagnosis Description Comment Added By Time Added    Dementia with behavioral disturbance, unspecified dementia type [F03.91] Dementia with behavioral disturbance, unspecified dementia type [F03.91]  Sanaz Grubbs MD 10/10/2018  5:22 PM    Aggressive behavior of adult [R46.89] Aggressive behavior of adult [R46.89]  Sanaz Grubbs MD 10/10/2018  5:22 PM      Hospital Problems as of 10/21/2018              Priority Class Noted POA    Agitation Medium  10/10/2018 Yes      Non-Hospital Problems as of 10/21/2018              Priority Class Noted    Benign essential hypertension Medium  2018    H/O: gout Medium  2018    Inflammatory arthritis Medium  2018    Type 2 diabetes mellitus with complication, without long-term current use of insulin (H) Medium  2018    PVD (peripheral vascular disease) (H) Medium  2018    Adjustment disorder with depressed mood Medium  2018    Multi-infarct dementia with depression Medium  2018    H/O: CVA (cerebrovascular accident) Medium  2018    Bilateral carotid artery occlusion Medium  2018    ASCVD (arteriosclerotic cardiovascular disease) Medium  2018    CKD (chronic kidney disease) stage 4, 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) Medium  2018    Hyperlipidemia LDL goal <70 Medium  2018      Code " Status History     Date Active Date Inactive Code Status Order ID Comments User Context    10/20/2018  8:05 AM  DNR/DNI [id number]  Tacho Henley MD Outpatient    10/10/2018  7:22 PM 10/20/2018  8:05 AM DNR/DNI [id number]  Yamile Toro MD Inpatient         Medication Review      START taking        Dose / Directions Comments    haloperidol 2 MG tablet   Commonly known as:  HALDOL   Used for:  Dementia with behavioral disturbance, unspecified dementia type, Aggressive behavior of adult        Dose:  2 mg   Take 1 tablet (2 mg) by mouth 2 times daily   Refills:  0        mirtazapine 15 MG tablet   Commonly known as:  REMERON   Used for:  Aggressive behavior of adult, Dementia with behavioral disturbance, unspecified dementia type        Dose:  15 mg   Take 1 tablet (15 mg) by mouth At Bedtime   Quantity:  30 tablet   Refills:  0        * OLANZapine 2.5 MG tablet   Commonly known as:  zyPREXA   Used for:  Dementia with behavioral disturbance, unspecified dementia type, Aggressive behavior of adult        Dose:  2.5 mg   Take 1 tablet (2.5 mg) by mouth every 8 hours as needed (agitation)   Quantity:  60 tablet   Refills:  0        * OLANZapine 5 MG tablet   Commonly known as:  zyPREXA   Used for:  Dementia with behavioral disturbance, unspecified dementia type, Aggressive behavior of adult        Dose:  5 mg   Take 1 tablet (5 mg) by mouth At Bedtime   Quantity:  30 tablet   Refills:  0        * Notice:  This list has 2 medication(s) that are the same as other medications prescribed for you. Read the directions carefully, and ask your doctor or other care provider to review them with you.      CONTINUE these medications which may have CHANGED, or have new prescriptions. If we are uncertain of the size of tablets/capsules you have at home, strength may be listed as something that might have changed.        Dose / Directions Comments    gabapentin 100 MG capsule   Commonly known as:  NEURONTIN   This may have  changed:    - how much to take  - when to take this   Used for:  Type 2 diabetes mellitus with complication, without long-term current use of insulin (H)        Dose:  200 mg   Take 2 capsules (200 mg) by mouth 3 times daily   Quantity:  90 capsule   Refills:  0        metoprolol succinate 25 MG 24 hr tablet   Commonly known as:  TOPROL-XL   This may have changed:  how much to take   Used for:  Essential hypertension        Dose:  75 mg   Take 3 tablets (75 mg) by mouth 2 times daily   Quantity:  62 tablet   Refills:  11          CONTINUE these medications which have NOT CHANGED        Dose / Directions Comments    allopurinol 100 MG tablet   Commonly known as:  ZYLOPRIM   Used for:  Hx of gout        Dose:  100 mg   Take 1 tablet (100 mg) by mouth daily   Quantity:  31 tablet   Refills:  11        ARTIFICIAL TEAR OP        Dose:  1 drop   Apply 1 drop to eye 4 times daily as needed To both eyes qid prn for dry/itchy eyes.   Refills:  0        aspirin 81 MG tablet   Used for:  ASVD (arteriosclerotic vascular disease)        Dose:  81 mg   Take 1 tablet (81 mg) by mouth daily   Quantity:  90 tablet   Refills:  3        cholecalciferol 1000 UNIT tablet   Commonly known as:  vitamin D3   Used for:  Vitamin D deficiency        Dose:  1000 Units   Take 1 tablet (1,000 Units) by mouth 2 times daily   Quantity:  30 tablet   Refills:  11        coenzyme Q-10 200 MG Caps   Used for:  ASCVD (arteriosclerotic cardiovascular disease)        Dose:  200 mg   Take 200 mg by mouth daily   Quantity:  31 capsule   Refills:  11        diclofenac 1 % Gel topical gel   Commonly known as:  VOLTAREN        Place onto the skin 4 times daily Apply to right wrist small layer TD   Refills:  0        EMOLLIENT EX        Externally apply topically 2 times daily To dry patch right hand index finger   Refills:  0        fish Oil 1200 MG capsule   Used for:  ASCVD (arteriosclerotic cardiovascular disease)        Dose:  1200 mg   Take 1 capsule  (1,200 mg) by mouth daily   Quantity:  31 capsule   Refills:  11        MAPAP PO   Indication:  Pain        Dose:  650 mg   Take 650 mg by mouth every 4 hours as needed for mild pain or fever   Refills:  0        melatonin 5 MG tablet        Dose:  5 mg   Take 5 mg by mouth 2 times daily at 1pm and bedtime   Refills:  0        metFORMIN 500 MG tablet   Commonly known as:  GLUCOPHAGE   Used for:  Type 2 diabetes mellitus with other specified complication, unspecified long term insulin use status        Dose:  500 mg   Take 1 tablet (500 mg) by mouth 2 times daily (with meals)   Quantity:  62 tablet   Refills:  11        * multivitamin Tabs tablet   Used for:  Takes dietary supplements        Dose:  1 tablet   Take 1 tablet by mouth 2 times daily   Quantity:  31 each   Refills:  11        * multivitamin, therapeutic with minerals Tabs tablet   Used for:  Takes dietary supplements        Dose:  1 tablet   Take 1 tablet by mouth daily   Quantity:  31 each   Refills:  11        order for DME   Used for:  Type 2 diabetes mellitus with complication, without long-term current use of insulin (H), PVD (peripheral vascular disease) (H)        Equipment being ordered: Glucerna/Protein Supplement Take 1-3 caner per day PRN   Quantity:  100 each   Refills:  11        polyethylene glycol 3350 Powd   Indication:  constipation        Dose:  17 g   Take 17 g by mouth daily as needed   Refills:  0        pravastatin 10 MG tablet   Commonly known as:  PRAVACHOL   Used for:  ASCVD (arteriosclerotic cardiovascular disease)        Dose:  40 mg   Take 4 tablets (40 mg) by mouth daily   Quantity:  31 tablet   Refills:  11        rosiglitazone 4 MG tablet   Commonly known as:  AVANDIA   Used for:  Type 2 diabetes mellitus with other specified complication, unspecified long term insulin use status        Dose:  4 mg   Take 1 tablet (4 mg) by mouth daily   Quantity:  31 tablet   Refills:  11        sertraline 25 MG tablet   Commonly known as:   ZOLOFT   Used for:  Anxiety, Depression, unspecified depression type        Dose:  200 mg   Take 8 tablets (200 mg) by mouth daily   Quantity:  31 tablet   Refills:  11        ticagrelor 90 MG tablet   Commonly known as:  BRILINTA   Used for:  ASCVD (arteriosclerotic cardiovascular disease)        Dose:  90 mg   Take 1 tablet (90 mg) by mouth 2 times daily   Quantity:  62 tablet   Refills:  11        timolol 0.5 % ophthalmic solution   Commonly known as:  TIMOPTIC   Used for:  Glaucoma of both eyes, unspecified glaucoma type        INSTILL ONE DROP TO EYE(S) TWICE DAILY   Quantity:  5 mL   Refills:  97    FOR AN ASSISTED LIVING PATIENT AT [facility]       * Notice:  This list has 2 medication(s) that are the same as other medications prescribed for you. Read the directions carefully, and ask your doctor or other care provider to review them with you.            Summary of Visit     Reason for your hospital stay       Agitation             After Care     Activity - Up ad cristopher           Advance Diet as Tolerated       Follow this diet upon discharge: Orders Placed This Encounter      Snacks/Supplements Adult: Boost Plus; Between Meals      High Consistent CHO Diet       General info for SNF       Length of Stay Estimate: Short Term Care: Estimated # of Days <30  Condition at Discharge: Stable  Level of care:skilled   Rehabilitation Potential: Fair  Admission H&P remains valid and up-to-date: Yes  Recent Chemotherapy: N/A  Use Nursing Home Standing Orders: Yes       Mantoux instructions       Give two-step Mantoux (PPD) Per Facility Policy Yes             Follow-Up Appointment Instructions     Future Labs/Procedures    Follow Up and recommended labs and tests     Comments:    Follow up with Nursing home physician.      Follow-Up Appointment Instructions     Follow Up and recommended labs and tests       Follow up with Nursing home physician.             Statement of Approval     Ordered          10/21/18 0913  I have  reviewed and agree with all the recommendations and orders detailed in this document.  EFFECTIVE NOW     Approved and electronically signed by:  Tacho Henley MD

## 2018-10-10 NOTE — IP AVS SNAPSHOT
"    [place] [address] [facility]: [phone]                                              INTERAGENCY TRANSFER FORM - LAB / IMAGING / EKG / EMG RESULTS   10/10/2018                    Hospital Admission Date: 10/10/2018  DULCE MARIA CROFT   : 1930  Sex: Male        Attending Provider: Kristina Arambula MD     Allergies:  Hmg-coa-r Inhibitors, Sitagliptin    Infection:  None   Service:  Observation    Ht:  1.753 m (5' 9\")   Wt:  88.6 kg (195 lb 4 oz)   Admission Wt:  88.6 kg (195 lb 4 oz)    BMI:  28.83 kg/m 2   BSA:  2.08 m 2            Patient PCP Information     Provider PCP Type    Leilani Aguila NP General         Lab Results - 3 Days      Glucose by meter [[id number]] (Abnormal)  Resulted: 10/21/18 1009, Result status: Final result    Ordering provider: Kristina Arambula MD  10/21/18 0212 Resulting lab: POINT OF CARE TEST, GLUCOSE    Specimen Information    Type Source Collected On     10/21/18 0212          Components       Value Reference Range Flag Lab   Glucose 103 70 - 99 mg/dL H 170            Basic metabolic panel [[id number]] (Abnormal)  Resulted: 10/21/18 0738, Result status: Final result    Ordering provider: Tacho Henley MD  10/21/18 0000 Resulting lab: [facility]    Specimen Information    Type Source Collected On   Blood  10/21/18 0705          Components       Value Reference Range Flag Lab   Sodium 142 133 - 144 mmol/L  FrRdHs   Potassium 4.3 3.4 - 5.3 mmol/L  FrRdHs   Chloride 111 94 - 109 mmol/L H FrRdHs   Carbon Dioxide 27 20 - 32 mmol/L  FrRdHs   Anion Gap 4 3 - 14 mmol/L  FrRdHs   Glucose 112 70 - 99 mg/dL H FrRdHs   Urea Nitrogen 25 7 - 30 mg/dL  FrRdHs   Creatinine 1.36 0.66 - 1.25 mg/dL H FrRdHs   GFR Estimate 49 >60 mL/min/1.7m2 L FrRdHs   Comment:  Non  GFR Calc   GFR Estimate If Black 60 >60 mL/min/1.7m2 L FrRdHs   Comment:  African American GFR Calc   Calcium 8.2 8.5 - 10.1 mg/dL L FrRdHs            Glucose by meter " [[id number]] (Abnormal)  Resulted: 10/21/18 0737, Result status: Final result    Ordering provider: Kristina Arambula MD  10/21/18 0723 Resulting lab: POINT OF CARE TEST, GLUCOSE    Specimen Information    Type Source Collected On     10/21/18 0723          Components       Value Reference Range Flag Lab   Glucose 118 70 - 99 mg/dL H 170            Glucose by meter [[id number]] (Abnormal)  Resulted: 10/20/18 2234, Result status: Final result    Ordering provider: Kristina Arambula MD  10/20/18 2221 Resulting lab: POINT OF CARE TEST, GLUCOSE    Specimen Information    Type Source Collected On     10/20/18 2221          Components       Value Reference Range Flag Lab   Glucose 112 70 - 99 mg/dL H 170            Glucose by meter [[id number]] (Abnormal)  Resulted: 10/20/18 1656, Result status: Final result    Ordering provider: Kristina Arambula MD  10/20/18 1642 Resulting lab: POINT OF CARE TEST, GLUCOSE    Specimen Information    Type Source Collected On     10/20/18 1642          Components       Value Reference Range Flag Lab   Glucose 229 70 - 99 mg/dL H 170            Glucose by meter [[id number]] (Abnormal)  Resulted: 10/20/18 1228, Result status: Final result    Ordering provider: Kristina Arambula MD  10/20/18 1216 Resulting lab: POINT OF CARE TEST, GLUCOSE    Specimen Information    Type Source Collected On     10/20/18 1216          Components       Value Reference Range Flag Lab   Glucose 195 70 - 99 mg/dL H 170            Basic metabolic panel [[id number]] (Abnormal)  Resulted: 10/20/18 0748, Result status: Final result    Ordering provider: Mayra Ryder MD  10/20/18 0000 Resulting lab: [facility]    Specimen Information    Type Source Collected On   Blood  10/20/18 0715          Components       Value Reference Range Flag Lab   Sodium 139 133 - 144 mmol/L  FrRdHs   Potassium 4.2 3.4 - 5.3 mmol/L  FrRdHs   Chloride 107 94 - 109 mmol/L  FrRdHs   Carbon Dioxide 24 20 - 32  mmol/L  FrRdHs   Anion Gap 8 3 - 14 mmol/L  FrRdHs   Glucose 143 70 - 99 mg/dL H FrRdHs   Urea Nitrogen 26 7 - 30 mg/dL  FrRdHs   Creatinine 1.40 0.66 - 1.25 mg/dL H FrRdHs   GFR Estimate 48 >60 mL/min/1.7m2 L FrRdHs   Comment:  Non  GFR Calc   GFR Estimate If Black 58 >60 mL/min/1.7m2 L FrRd   Comment:  African American GFR Calc   Calcium 8.8 8.5 - 10.1 mg/dL  FrRdHs            Glucose by meter [[id number]] (Abnormal)  Resulted: 10/20/18 0237, Result status: Final result    Ordering provider: Kristina Arambula MD  10/20/18 0205 Resulting lab: POINT OF CARE TEST, GLUCOSE    Specimen Information    Type Source Collected On     10/20/18 0205          Components       Value Reference Range Flag Lab   Glucose 170 70 - 99 mg/dL H 170            Glucose by meter [[id number]] (Abnormal)  Resulted: 10/19/18 2202, Result status: Final result    Ordering provider: Kristina Arambula MD  10/19/18 2149 Resulting lab: POINT OF CARE TEST, GLUCOSE    Specimen Information    Type Source Collected On     10/19/18 2149          Components       Value Reference Range Flag Lab   Glucose 132 70 - 99 mg/dL H 170            Glucose by meter [[id number]] (Abnormal)  Resulted: 10/19/18 1715, Result status: Final result    Ordering provider: Kristina Arambula MD  10/19/18 1659 Resulting lab: POINT OF CARE TEST, GLUCOSE    Specimen Information    Type Source Collected On     10/19/18 1659          Components       Value Reference Range Flag Lab   Glucose 185 70 - 99 mg/dL H 170            Glucose by meter [[id number]] (Abnormal)  Resulted: 10/19/18 1216, Result status: Final result    Ordering provider: Kristina Arambula MD  10/19/18 1203 Resulting lab: POINT OF CARE TEST, GLUCOSE    Specimen Information    Type Source Collected On     10/19/18 1203          Components       Value Reference Range Flag Lab   Glucose 133 70 - 99 mg/dL H 170            Basic metabolic panel [[id number]] (Abnormal)  Resulted: 10/19/18  0714, Result status: Final result    Ordering provider: Mayra Ryder MD  10/19/18 0000 Resulting lab: [facility]    Specimen Information    Type Source Collected On   Blood  10/19/18 0638          Components       Value Reference Range Flag Lab   Sodium 140 133 - 144 mmol/L  FrRdHs   Potassium 3.9 3.4 - 5.3 mmol/L  FrRdHs   Chloride 108 94 - 109 mmol/L  FrRdHs   Carbon Dioxide 25 20 - 32 mmol/L  FrRdHs   Anion Gap 7 3 - 14 mmol/L  FrRdHs   Glucose 161 70 - 99 mg/dL H FrRdHs   Urea Nitrogen 29 7 - 30 mg/dL  FrRdHs   Creatinine 1.42 0.66 - 1.25 mg/dL H FrRdHs   GFR Estimate 47 >60 mL/min/1.7m2 L FrRdHs   Comment:  Non  GFR Calc   GFR Estimate If Black 57 >60 mL/min/1.7m2 L FrRdHs   Comment:  African American GFR Calc   Calcium 8.6 8.5 - 10.1 mg/dL  FrRdHs            Glucose by meter [[id number]]  Resulted: 10/19/18 0358, Result status: Final result    Ordering provider: Kristina Arambula MD  10/19/18 0245 Resulting lab: POINT OF CARE TEST, GLUCOSE    Specimen Information    Type Source Collected On     10/19/18 0245          Components       Value Reference Range Flag Lab   Glucose 96 70 - 99 mg/dL  170            Glucose by meter [[id number]]  Resulted: 10/18/18 2153, Result status: Final result    Ordering provider: Kristina Arambula MD  10/18/18 2140 Resulting lab: POINT OF CARE TEST, GLUCOSE    Specimen Information    Type Source Collected On     10/18/18 2140          Components       Value Reference Range Flag Lab   Glucose 99 70 - 99 mg/dL  170            Glucose by meter [[id number]] (Abnormal)  Resulted: 10/18/18 1922, Result status: Final result    Ordering provider: Kristina Arambula MD  10/18/18 1909 Resulting lab: POINT OF CARE TEST, GLUCOSE    Specimen Information    Type Source Collected On     10/18/18 1909          Components       Value Reference Range Flag Lab   Glucose 147 70 - 99 mg/dL H 170            Glucose by meter [[id number]] (Abnormal)   Resulted: 10/18/18 1250, Result status: Final result    Ordering provider: Kristina Arambula MD  10/18/18 1235 Resulting lab: POINT OF CARE TEST, GLUCOSE    Specimen Information    Type Source Collected On     10/18/18 1235          Components       Value Reference Range Flag Lab   Glucose 175 70 - 99 mg/dL H 170            Basic metabolic panel [[id number]] (Abnormal)  Resulted: 10/18/18 0721, Result status: Final result    Ordering provider: Leigh Hill MD  10/18/18 0001 Resulting lab: [facility]    Specimen Information    Type Source Collected On   Blood  10/18/18 0624          Components       Value Reference Range Flag Lab   Sodium 140 133 - 144 mmol/L  FrRdHs   Potassium 4.1 3.4 - 5.3 mmol/L  FrRdHs   Chloride 106 94 - 109 mmol/L  FrRdHs   Carbon Dioxide 25 20 - 32 mmol/L  FrRdHs   Anion Gap 9 3 - 14 mmol/L  FrRdHs   Glucose 114 70 - 99 mg/dL H FrRdHs   Urea Nitrogen 24 7 - 30 mg/dL  FrRdHs   Creatinine 1.35 0.66 - 1.25 mg/dL H FrRdHs   GFR Estimate 50 >60 mL/min/1.7m2 L FrRdHs   Comment:  Non  GFR Calc   GFR Estimate If Black 60 >60 mL/min/1.7m2 L FrRdHs   Comment:  African American GFR Calc   Calcium 8.1 8.5 - 10.1 mg/dL L FrRdHs            Hepatic panel [[id number]] (Abnormal)  Resulted: 10/18/18 0721, Result status: Final result    Ordering provider: Leigh Hill MD  10/18/18 0001 Resulting lab: [facility]    Specimen Information    Type Source Collected On   Blood  10/18/18 0624          Components       Value Reference Range Flag Lab   Bilirubin Direct 0.1 0.0 - 0.2 mg/dL  FrRdHs   Bilirubin Total 0.3 0.2 - 1.3 mg/dL  FrRdHs   Albumin 2.7 3.4 - 5.0 g/dL L FrRdHs   Protein Total 5.9 6.8 - 8.8 g/dL L FrRdHs   Alkaline Phosphatase 76 40 - 150 U/L  FrRdHs   ALT 13 0 - 70 U/L  FrRdHs   AST 15 0 - 45 U/L  FrRdHs            CBC with platelets [[id number]] (Abnormal)  Resulted: 10/18/18 0702, Result status: Final result    Ordering provider:  Leigh Hill MD  10/18/18 0001 Resulting lab: [facility]    Specimen Information    Type Source Collected On   Blood  10/18/18 0624          Components       Value Reference Range Flag Lab   WBC 8.4 4.0 - 11.0 10e9/L  FrRdHs   RBC Count 3.74 4.4 - 5.9 10e12/L L FrRdHs   Hemoglobin 11.6 13.3 - 17.7 g/dL L FrRdHs   Hematocrit 36.3 40.0 - 53.0 % L FrRdHs   MCV 97 78 - 100 fl  FrRdHs   MCH 31.0 26.5 - 33.0 pg  FrRdHs   MCHC 32.0 31.5 - 36.5 g/dL  FrRdHs   RDW 14.4 10.0 - 15.0 %  FrRdHs   Platelet Count 280 150 - 450 10e9/L  FrRdHs            Glucose by meter [[id number]] (Abnormal)  Resulted: 10/18/18 0248, Result status: Final result    Ordering provider: Kristina Arambula MD  10/18/18 0218 Resulting lab: POINT OF CARE TEST, GLUCOSE    Specimen Information    Type Source Collected On     10/18/18 0218          Components       Value Reference Range Flag Lab   Glucose 118 70 - 99 mg/dL H 170            Testing Performed By     Lab - Abbreviation Name Director Address Valid Date Range    [facility] [address] [facility] [address] 05/08/15 1057 - Present    170 - Unknown POINT OF CARE TEST, GLUCOSE Unknown Unknown 10/31/11 1114 - Present            Unresulted Labs     None      Encounter-Level Documents:     There are no encounter-level documents.      Order-Level Documents:     There are no order-level documents.

## 2018-10-10 NOTE — IP AVS SNAPSHOT
[place] [address] [facility]    [address]    [facility] [address]    Phone:  [phone]    Fax:  [phone]                                       After Visit Summary   10/10/2018    Cecil Martinez    MRN: [id number]           After Visit Summary Signature Page     I have received my discharge instructions, and my questions have been answered. I have discussed any challenges I see with this plan with the nurse or doctor.    ..........................................................................................................................................  Patient/Patient Representative Signature      ..........................................................................................................................................  Patient Representative Print Name and Relationship to Patient    ..................................................               ................................................  Date                                   Time    ..........................................................................................................................................  Reviewed by Signature/Title    ...................................................              ..............................................  Date                                               Time          22EPIC Rev 08/18

## 2018-10-10 NOTE — PHARMACY-ADMISSION MEDICATION HISTORY
Admission medication history interview status for this patient is complete. See [facility] admission navigator for allergy information, prior to admission medications and immunization status.     Medication history interview source(s):Caregiver  Medication history resources (including written lists, pill bottles, clinic record):[facility]  Primary pharmacy:[facility]    Changes made to PTA medication list:  Added: None  Deleted: None  Changed: None    Actions taken by pharmacist (provider contacted, etc):None     Additional medication history information:None    Medication reconciliation/reorder completed by provider prior to medication history? No    Do you take OTC medications (eg tylenol, ibuprofen, fish oil, eye/ear drops, etc)? Y    Prior to Admission medications    Medication Sig Last Dose Taking? Auth Provider   allopurinol (ZYLOPRIM) 100 MG tablet Take 1 tablet (100 mg) by mouth daily 10/10/2018 at 1000 Yes Leilani Aguila NP   aspirin 81 MG tablet Take 1 tablet (81 mg) by mouth daily 10/10/2018 at 1000 Yes Leilani Aguila NP   cholecalciferol (VITAMIN D3) 1000 UNIT tablet Take 1 tablet (1,000 Units) by mouth 2 times daily 10/10/2018 at 1000 Yes Leilani Aguila NP   coenzyme Q-10 200 MG CAPS Take 200 mg by mouth daily 10/10/2018 at 1000 Yes Leilani Aguila NP   diclofenac (VOLTAREN) 1 % GEL topical gel Place onto the skin 4 times daily Apply to right wrist small layer TD 10/10/2018 at 1300 Yes Reported, Patient   EMOLLIENT EX Externally apply topically 2 times daily To dry patch right hand index finger 10/10/2018 at 1000 Yes Reported, Patient   gabapentin (NEURONTIN) 100 MG capsule Take 1 capsule (100 mg) by mouth 2 times daily 10/10/2018 at 1000 Yes Leilani Aguila NP   melatonin 5 MG tablet Take 5 mg by mouth 2 times daily at 1pm and bedtime 10/10/2018 at 1300 Yes Unknown, Entered By History   metFORMIN (GLUCOPHAGE) 500 MG tablet Take 1 tablet (500 mg) by mouth 2 times  daily (with meals) 10/10/2018 at 1000 Yes Leilani Aguila NP   metoprolol succinate (TOPROL-XL) 25 MG 24 hr tablet Take 4 tablets (100 mg) by mouth 2 times daily 10/10/2018 at 1000 Yes Leilani Aguila NP   multivitamin (OCUVITE) TABS tablet Take 1 tablet by mouth 2 times daily 10/10/2018 at 1000 Yes Leilani Aguila NP   multivitamin, therapeutic with minerals (THERA-VIT-M) TABS tablet Take 1 tablet by mouth daily 10/10/2018 at 1000 Yes Leilani Aguila NP   Omega-3 Fatty Acids (FISH OIL) 1200 MG capsule Take 1 capsule (1,200 mg) by mouth daily 10/10/2018 at 1000 Yes Leilani Aguila NP   pravastatin (PRAVACHOL) 10 MG tablet Take 4 tablets (40 mg) by mouth daily 10/10/2018 at 1000 Yes Leilani Aguila NP   rosiglitazone (AVANDIA) 4 MG tablet Take 1 tablet (4 mg) by mouth daily 10/10/2018 at 1000 Yes Leilani Aguila NP   sertraline (ZOLOFT) 25 MG tablet Take 8 tablets (200 mg) by mouth daily 10/10/2018 at 1300 Yes Leilani Aguila NP   ticagrelor (BRILINTA) 90 MG tablet Take 1 tablet (90 mg) by mouth 2 times daily 10/10/2018 at 1000 Yes Leilani Aguila NP   timolol (TIMOPTIC) 0.5 % ophthalmic solution INSTILL ONE DROP TO EYE(S) TWICE DAILY 10/10/2018 at 1000 Yes Leilani Aguila NP   Acetaminophen (MAPAP PO) Take 650 mg by mouth every 4 hours as needed for mild pain or fever Unknown at Unknown time  Reported, Patient   ARTIFICIAL TEAR OP Apply 1 drop to eye 4 times daily as needed To both eyes qid prn for dry/itchy eyes. Unknown at Unknown time  Reported, Patient   order for DME Equipment being ordered: Glucerna/Protein Supplement  Take 1-3 caner per day PRN   Devon Reyes MD   polyethylene glycol 3350 POWD Take 17 g by mouth daily as needed  Unknown at Unknown time  Reported, Patient
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
## 2018-10-10 NOTE — ED PROVIDER NOTES
[facility] Emergency Medicine Sign-out Note:       HPI:  This is a 88 year old male who was signed out to me by Dr. Grubbs  pending CT and xray.  Briefly, patient presented with fall and behavioral disturbances.  The workup here was significant for labs as noted below.   I was asked to reassess the patient and follow-up on CT and xrays.    RESULTS:   Results for orders placed or performed during the hospital encounter of 10/10/18 (from the past 24 hour(s))   Basic metabolic panel     Status: Abnormal    Collection Time: 10/10/18  2:10 PM   Result Value Ref Range    Sodium 137 133 - 144 mmol/L    Potassium 4.4 3.4 - 5.3 mmol/L    Chloride 101 94 - 109 mmol/L    Carbon Dioxide 30 20 - 32 mmol/L    Anion Gap 6 3 - 14 mmol/L    Glucose 178 (H) 70 - 99 mg/dL    Urea Nitrogen 20 7 - 30 mg/dL    Creatinine 1.25 0.66 - 1.25 mg/dL    GFR Estimate 54 (L) >60 mL/min/1.7m2    GFR Estimate If Black 66 >60 mL/min/1.7m2    Calcium 8.5 8.5 - 10.1 mg/dL   CBC with platelets differential     Status: Abnormal    Collection Time: 10/10/18  2:10 PM   Result Value Ref Range    WBC 8.8 4.0 - 11.0 10e9/L    RBC Count 4.18 (L) 4.4 - 5.9 10e12/L    Hemoglobin 13.2 (L) 13.3 - 17.7 g/dL    Hematocrit 40.8 40.0 - 53.0 %    MCV 98 78 - 100 fl    MCH 31.6 26.5 - 33.0 pg    MCHC 32.4 31.5 - 36.5 g/dL    RDW 14.1 10.0 - 15.0 %    Platelet Count 296 150 - 450 10e9/L    Diff Method Automated Method     % Neutrophils 61.7 %    % Lymphocytes 15.7 %    % Monocytes 10.0 %    % Eosinophils 11.1 %    % Basophils 0.7 %    % Immature Granulocytes 0.8 %    Nucleated RBCs 0 0 /100    Absolute Neutrophil 5.5 1.6 - 8.3 10e9/L    Absolute Lymphocytes 1.4 0.8 - 5.3 10e9/L    Absolute Monocytes 0.9 0.0 - 1.3 10e9/L    Absolute Eosinophils 1.0 (H) 0.0 - 0.7 10e9/L    Absolute Basophils 0.1 0.0 - 0.2 10e9/L    Abs Immature Granulocytes 0.1 0 - 0.4 10e9/L    Absolute Nucleated RBC 0.0    INR     Status: None    Collection Time: 10/10/18  2:10 PM   Result Value  Ref Range    INR 1.02 0.86 - 1.14   UA with Microscopic     Status: Abnormal    Collection Time: 10/10/18  4:39 PM   Result Value Ref Range    Color Urine Yellow     Appearance Urine Clear     Glucose Urine Negative NEG^Negative mg/dL    Bilirubin Urine Negative NEG^Negative    Ketones Urine Negative NEG^Negative mg/dL    Specific Gravity Urine 1.015 1.003 - 1.035    Blood Urine Negative NEG^Negative    pH Urine 6.0 5.0 - 7.0 pH    Protein Albumin Urine 100 (A) NEG^Negative mg/dL    Urobilinogen mg/dL 0.0 0.0 - 2.0 mg/dL    Nitrite Urine Negative NEG^Negative    Leukocyte Esterase Urine Negative NEG^Negative    Source Midstream Urine     WBC Urine 2 0 - 5 /HPF    RBC Urine 1 0 - 2 /HPF    Squamous Epithelial /HPF Urine 1 0 - 1 /HPF    Mucous Urine Present (A) NEG^Negative /LPF    Hyaline Casts 1 0 - 2 /LPF   CT Head w/o Contrast     Status: None    Collection Time: 10/10/18  5:41 PM    Narrative    CT SCAN OF THE HEAD WITHOUT CONTRAST   10/10/2018 5:41 PM     HISTORY: Trauma, evaluate fracture, bleed.     TECHNIQUE: Axial images of the head and coronal reformations without  IV contrast material. Radiation dose for this scan was reduced using  automated exposure control, adjustment of the mA and/or kV according  to patient size, or iterative reconstruction technique.    COMPARISON: None.    FINDINGS: Moderate to severe volume loss is present. Extensive white  matter hypoattenuation likely represents chronic small vessel ischemic  change, advanced for age. Multiple bilateral basal ganglia  subcentimeter areas of hypoattenuation likely represent old lacunar  infarcts. No evidence of acute ischemia, hemorrhage, mass, mass effect  or hydrocephalus. The visualized calvarium, skull base, paranasal  sinuses, and extra cranial soft tissues are unremarkable. Right  orbital foreign body/prosthesis is noted. Bilateral lens replacements  are present.      Impression    IMPRESSION: No acute intracranial abnormality.    TILA JUDGE  MD KENNETH   XR Sacrum and Coccyx 2 Views     Status: None (Preliminary result)    Collection Time: 10/10/18  5:53 PM    Narrative    XR SACRUM AND COCCYX TWO VIEWS   10/10/2018 5:53 PM     HISTORY: Ecchymosis, fall, tenderness.    COMPARISON: None.      Impression    IMPRESSION: Negative for fracture. Degenerative changes are seen in  the spine.       Exam: A brief, limited, pertinent physical exam was performed after results obtained, exam is below.     Vitals:    Vitals:    10/10/18 1400 10/10/18 1415 10/10/18 1430 10/10/18 1445   BP:       Temp:       TempSrc:       SpO2: 94% 96% 92% 95%     Gen: Patient is alert and interactive  Neuro: He is sleepy.  Awakens.   Psych: No agitation  CV:  RRR  Resp:  Sats 94% while sleeping,  Scattered occasional wheezes    Medical Decision Making:   This is a 88 year old male who presented with behavioral disturb ance, falls.  Please see previous ED Provider note for specifics regarding workup and medical decision making up to this point.  My reexamination and the pending results I was asked to follow-up on indicates safe to hospitalize here.  Agitation is better after meds.     Impression:    ICD-10-CM    1. Dementia with behavioral disturbance, unspecified dementia type F03.91    2. Aggressive behavior of adult R46.89        Plan:    Admit to medicine              Eric Schneider MD  Emergency Medicine Physician  [facility]         Eric Schneider MD  10/10/18 0353
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
## 2018-10-11 ENCOUNTER — APPOINTMENT (OUTPATIENT)
Dept: PHYSICAL THERAPY | Facility: CLINIC | Age: 83
DRG: 057 | End: 2018-10-11
Attending: INTERNAL MEDICINE
Payer: MEDICARE

## 2018-10-11 LAB
GLUCOSE BLDC GLUCOMTR-MCNC: 117 MG/DL (ref 70–99)
GLUCOSE BLDC GLUCOMTR-MCNC: 149 MG/DL (ref 70–99)
GLUCOSE BLDC GLUCOMTR-MCNC: 153 MG/DL (ref 70–99)
GLUCOSE BLDC GLUCOMTR-MCNC: 155 MG/DL (ref 70–99)
GLUCOSE BLDC GLUCOMTR-MCNC: 210 MG/DL (ref 70–99)

## 2018-10-11 PROCEDURE — 97163 PT EVAL HIGH COMPLEX 45 MIN: CPT | Mod: GP

## 2018-10-11 PROCEDURE — 25000131 ZZH RX MED GY IP 250 OP 636 PS 637: Mod: GY | Performed by: INTERNAL MEDICINE

## 2018-10-11 PROCEDURE — 96376 TX/PRO/DX INJ SAME DRUG ADON: CPT

## 2018-10-11 PROCEDURE — 00000146 ZZHCL STATISTIC GLUCOSE BY METER IP

## 2018-10-11 PROCEDURE — 25000125 ZZHC RX 250: Performed by: INTERNAL MEDICINE

## 2018-10-11 PROCEDURE — 96375 TX/PRO/DX INJ NEW DRUG ADDON: CPT

## 2018-10-11 PROCEDURE — 40000916 ZZH STATISTIC SITTER, NIGHT HOURS

## 2018-10-11 PROCEDURE — 25000128 H RX IP 250 OP 636: Performed by: INTERNAL MEDICINE

## 2018-10-11 PROCEDURE — 96374 THER/PROPH/DIAG INJ IV PUSH: CPT

## 2018-10-11 PROCEDURE — 40000915 ZZH STATISTIC SITTER, EVENING HOURS

## 2018-10-11 PROCEDURE — 25000132 ZZH RX MED GY IP 250 OP 250 PS 637: Mod: GY | Performed by: INTERNAL MEDICINE

## 2018-10-11 PROCEDURE — A9270 NON-COVERED ITEM OR SERVICE: HCPCS | Mod: GY | Performed by: INTERNAL MEDICINE

## 2018-10-11 PROCEDURE — 40000193 ZZH STATISTIC PT WARD VISIT

## 2018-10-11 PROCEDURE — 99226 ZZC SUBSEQUENT OBSERVATION CARE,LEVEL III: CPT | Performed by: INTERNAL MEDICINE

## 2018-10-11 PROCEDURE — 96372 THER/PROPH/DIAG INJ SC/IM: CPT

## 2018-10-11 PROCEDURE — G0378 HOSPITAL OBSERVATION PER HR: HCPCS

## 2018-10-11 RX ORDER — SERTRALINE HYDROCHLORIDE 100 MG/1
200 TABLET, FILM COATED ORAL DAILY
Status: DISCONTINUED | OUTPATIENT
Start: 2018-10-11 | End: 2018-10-21 | Stop reason: HOSPADM

## 2018-10-11 RX ORDER — TIMOLOL MALEATE 5 MG/ML
1 SOLUTION/ DROPS OPHTHALMIC 2 TIMES DAILY
Status: DISCONTINUED | OUTPATIENT
Start: 2018-10-11 | End: 2018-10-21 | Stop reason: HOSPADM

## 2018-10-11 RX ORDER — HALOPERIDOL 5 MG/ML
2 INJECTION INTRAMUSCULAR EVERY 4 HOURS PRN
Status: DISCONTINUED | OUTPATIENT
Start: 2018-10-11 | End: 2018-10-17

## 2018-10-11 RX ORDER — PRAVASTATIN SODIUM 20 MG
40 TABLET ORAL DAILY
Status: DISCONTINUED | OUTPATIENT
Start: 2018-10-11 | End: 2018-10-21 | Stop reason: HOSPADM

## 2018-10-11 RX ADMIN — GABAPENTIN 100 MG: 100 CAPSULE ORAL at 09:16

## 2018-10-11 RX ADMIN — MELATONIN TAB 3 MG 5 MG: 3 TAB at 20:12

## 2018-10-11 RX ADMIN — TIMOLOL MALEATE 1 DROP: 5 SOLUTION/ DROPS OPHTHALMIC at 20:16

## 2018-10-11 RX ADMIN — METFORMIN HYDROCHLORIDE 500 MG: 500 TABLET, FILM COATED ORAL at 09:17

## 2018-10-11 RX ADMIN — TICAGRELOR 90 MG: 90 TABLET ORAL at 09:17

## 2018-10-11 RX ADMIN — VITAMIN D, TAB 1000IU (100/BT) 1000 UNITS: 25 TAB at 20:15

## 2018-10-11 RX ADMIN — METOPROLOL SUCCINATE 25 MG: 25 TABLET, EXTENDED RELEASE ORAL at 20:12

## 2018-10-11 RX ADMIN — PRAVASTATIN SODIUM 40 MG: 20 TABLET ORAL at 12:36

## 2018-10-11 RX ADMIN — DICLOFENAC SODIUM 2 G: 10 GEL TOPICAL at 18:03

## 2018-10-11 RX ADMIN — ACETAMINOPHEN 650 MG: 325 TABLET, FILM COATED ORAL at 19:20

## 2018-10-11 RX ADMIN — GABAPENTIN 100 MG: 100 CAPSULE ORAL at 20:12

## 2018-10-11 RX ADMIN — MELATONIN TAB 3 MG 5 MG: 3 TAB at 12:36

## 2018-10-11 RX ADMIN — ONDANSETRON 4 MG: 2 INJECTION, SOLUTION INTRAMUSCULAR; INTRAVENOUS at 07:37

## 2018-10-11 RX ADMIN — INSULIN ASPART 1 UNITS: 100 INJECTION, SOLUTION INTRAVENOUS; SUBCUTANEOUS at 12:29

## 2018-10-11 RX ADMIN — METOPROLOL SUCCINATE 25 MG: 25 TABLET, EXTENDED RELEASE ORAL at 09:17

## 2018-10-11 RX ADMIN — SERTRALINE HYDROCHLORIDE 200 MG: 100 TABLET ORAL at 12:36

## 2018-10-11 RX ADMIN — TICAGRELOR 90 MG: 90 TABLET ORAL at 20:12

## 2018-10-11 RX ADMIN — QUETIAPINE FUMARATE 25 MG: 25 TABLET ORAL at 20:13

## 2018-10-11 RX ADMIN — ASPIRIN 81 MG 81 MG: 81 TABLET ORAL at 09:17

## 2018-10-11 RX ADMIN — HALOPERIDOL LACTATE 2 MG: 5 INJECTION, SOLUTION INTRAMUSCULAR at 05:03

## 2018-10-11 RX ADMIN — VITAMIN D, TAB 1000IU (100/BT) 1000 UNITS: 25 TAB at 12:37

## 2018-10-11 RX ADMIN — QUETIAPINE FUMARATE 25 MG: 25 TABLET ORAL at 09:17

## 2018-10-11 NOTE — PLAN OF CARE
"Problem: Patient Care Overview  Goal: Plan of Care/Patient Progress Review  Discharge Planner PT   Patient plan for discharge: Not stated   Current status:       Eval complete. Pt is OBS status. Pt lethargic during session however able to follow one step commands with hand over hand assist and cues. Pt has progressed dementia. Pt was not aggressive during session. RN and NST present throughout.   Contacted daughter Clara to gather PLOF, per daughter pt Mod I in room with FWW, toileting mod I. bathing with assist from staff. Would have meals delivered or walk down to dining mendiola. Pt overall mobilized well however falls at night when trying to get into bed with wife as well as when getting up from chair quickly to \"valentin someone out of the room\" Pt has not been falling when walking room to room with walker during the day per daughter   Supine > sit with mod A x 1. 100% cues for scooting toward EOB for proper LE ft position, pt does well with external cues. STS x 1 with min A x 2, once standing focus on upright posture and improve hip extension, pt stood for ~ 4 min, nursing staff assisting with brief change and pericares. Pt engaged in side stepping with mod A x 2, unable to clear LE from floor. Sit > Supine with mod A x 2. Pt left supine with nursing staff present  Barriers to return to prior living situation: Cognition, A x 2 for mobility, unable to initiate gait as pt lethargic, gross deconditioning, fall hx   Recommendations for discharge: TCU with memory care  Rationale for recommendations: Pt will require TCU stay as pt currently requiring assist of 2 for mobilitly, pt below baseline levels as per daughter pt mod I with bed mobility, transfers, gait prior to admission. PT to complete orders as discharge plan has been identified          Entered by: Lani Montalvo 10/11/2018 10:45 AM           "
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
## 2018-10-11 NOTE — PROGRESS NOTES
PRIMARY DIAGNOSIS: GENERALIZED WEAKNESS    OUTPATIENT/OBSERVATION GOALS TO BE MET BEFORE DISCHARGE  1. Orthostatic performed: No    2. Tolerating PO medications: Yes    3. Return to near baseline physical activity: No    4. Cleared for discharge by consultants (if involved): No    Discharge Planner Nurse   Safe discharge environment identified: No  Barriers to discharge: Yes       Entered by: Lurdes Oseguera 10/11/2018 10:56 AM      Pt lethargic and oriented to self only. VSS, pt denies pain, headache, dizziness, & SOB. Pt reports nausea/stomach upset. Administered Zofran. Pt somewhat cooperative, very sleepy. Did stay awake long enough to eat some breakfast and took AM meds. , Novolog not available to administer for AM dose. PT was able to get patient to stand at bedside for a few minutes. Pt made a couple of derogatory racial comments to staff, but was redirectable. Will continue with plan of care.    Pt waiting for placement at new memory care unit (current facility refuses to take him back).    Please review provider order for any additional goals.   Nurse to notify provider when observation goals have been met and patient is ready for discharge.
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
## 2018-10-11 NOTE — PLAN OF CARE
"PRIMARY DIAGNOSIS: \"GENERIC\" NURSING  OUTPATIENT/OBSERVATION GOALS TO BE MET BEFORE DISCHARGE:  1. ADLs back to baseline: No    2. Activity and level of assistance: Up with maximum assistance. Consider SW and/or PT evaluation.     3. Pain status: Pain free.    4. Return to near baseline physical activity: No     Discharge Planner Nurse   Safe discharge environment identified: No  Barriers to discharge: Yes       Entered by: Radha Clarke 10/11/2018 6:05 AM     Please review provider order for any additional goals.   Nurse to notify provider when observation goals have been met and patient is ready for discharge.    Problem: Fall Risk (Adult)  Goal: Identify Related Risk Factors and Signs and Symptoms  Related risk factors and signs and symptoms are identified upon initiation of Human [employer] Clinical Practice Guideline (CPG).   Outcome: No Change  VS stable. Alert x 1. Confused and combative. Hitting, kicking, throwing objects, and cursing at staff. Up and down most of night. Sitter at bedside for safety.        "

## 2018-10-11 NOTE — PROGRESS NOTES
"   10/11/18 1023   Quick Adds   Type of Visit Initial PT Evaluation   Living Environment   Lives With spouse   Living Arrangements assisted living   Home Accessibility no concerns   Living Environment Comment Hx obtained from chart review, pt lethargic with garbled speech, received Haldol prior to session.    Self-Care   Activity/Exercise/Self-Care Comment Unable to obtain hx 2/2 dementia, poor historian, lethargic. Per chart review pt will need alternate placement as pt agressive and hitting staff at [facility]   Functional Level Prior   Fall history within last six months yes   Prior Functional Level Comment Pt lethargic, has dementia, unable to obtain PLOF at this time. Per chart review pt has had frequent falls at [facility] and has had increasingly aggressive behavior. Contacted daughter Clara to gather PLOF, per daughter pt Mod I in room with FWW, toileting mod I. bathing with assist from staff. Would have meals delivered or walk down to dining mendiola. 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 The patient states he does not know why he is here. His only complaint is \"tailbone pain\" from a recent fall onto his \"butt.\" He can provide no information regarding the circumstances of this fall or when it occurred. Denies LOC or head injury. Denies CP or dyspnea. " "  Precautions/Limitations fall precautions   General Observations RN present throughout evaluation    Cognitive Status Examination   Cognitive Comment Pt has progressed dementia, garbled speech, lethargic   Integumentary/Edema   Integumentary/Edema Comments laceration on knees, see nursing notes for details   Posture    Posture Forward head position   Range of Motion (ROM)   ROM Comment WFL   Strength   Strength Comments Gross deconditioning, pt has difficulty obtaining full BLE hip extension and knee extension, pt unable to clear LE from floor when engagede in side stepping, use of FWW.   Bed Mobility   Bed Mobility Comments Supine > sitting EOB with mod A x 1 and HOB elevated   Transfer Skills   Transfer Comments STS with FWW and mod A x 2 with FWW. Pt stood EOB for ~ 4 minutes    Gait   Gait Comments Not safe to initaite at this time   Balance   Balance Comments Pt requires BUE support on bed to maintain sitting balance. Close CGA standing with BUE support on FWW, unable to clear LE from floor   Sensory Examination   Sensory Perception Comments Unable to accurately assess    Clinical Impression   Criteria for Skilled Therapeutic Intervention evaluation only   PT Diagnosis Decreased functional mobility fromf baseline    Influenced by the following impairments Dementia, PMH, age, gross deconditioning, lethargic    Functional limitations due to impairments see above   Clinical Presentation Unstable/Unpredictable   Clinical Presentation Rationale Age, cognition, fluctuating functional mobility    Clinical Decision Making (Complexity) High complexity   Therapy Frequency` 5 times/week   Predicted Duration of Therapy Intervention (days/wks) 1 week   Anticipated Discharge Disposition Transitional Care Facility  (TCU with memory care unit )   Risk & Benefits of therapy have been explained Yes   Patient, Family & other staff in agreement with plan of care Yes   [facility] AM-PAC  \"6 Clicks\" V.2 Basic Mobility " Inpatient Short Form   1. Turning from your back to your side while in a flat bed without using bedrails? 3 - A Little   2. Moving from lying on your back to sitting on the side of a flat bed without using bedrails? 3 - A Little   3. Moving to and from a bed to a chair (including a wheelchair)? 3 - A Little   4. Standing up from a chair using your arms (e.g., wheelchair, or bedside chair)? 3 - A Little   5. To walk in hospital room? 2 - A Lot   6. Climbing 3-5 steps with a railing? 2 - A Lot   Basic Mobility Raw Score (Score out of 24.Lower scores equate to lower levels of function) 16   Total Evaluation Time   Total Evaluation Time (Minutes) 20

## 2018-10-11 NOTE — PROGRESS NOTES
351 DB- pt agitated and combative. He is hitting staff, kicking, and throwing objects at staff. Can we get an increase in dose and frequency of Haldol or something else that may help? Thank you

## 2018-10-11 NOTE — PLAN OF CARE
"Problem: Patient Care Overview  Goal: Plan of Care/Patient Progress Review  Outcome: No Change  PRIMARY DIAGNOSIS: \"GENERIC\" NURSING  OUTPATIENT/OBSERVATION GOALS TO BE MET BEFORE DISCHARGE:  1. ADLs back to baseline: No    2. Activity and level of assistance: Up with maximum assistance. Consider SW and/or PT evaluation. (both following for tomorrow)     3. Pain status: Pain free.    4. Return to near baseline physical activity: No     Discharge Planner Nurse   Safe discharge environment identified: No  Barriers to discharge: Yes, patient very aggressive at home. Social work to follow tomorrow        Entered by: Perla Meyer 10/10/2018 11:03 PM     Please review provider order for any additional goals.   Nurse to notify provider when observation goals have been met and patient is ready for discharge.      VS stable. A & O x 1, only alert to self occassionaly.   Diet:mod cho.   Ambulates: with mechanical lift as unable to transfer patient safely while agitated, PT will evaluate tomorrow.   IV meds:saline locked, IV placed on this shift.   B  Pain:ROHIT, patient sleeping peacefully.   Abnormal assessments: patient confused but has been sleeping since admit at 1930. Have not been able to given any meds as patient is very lethargic after a day of being extremely agitated in ED and prior to arrival to hospital.    Intake & output: incontinent of urine.   Discharge plan: not yet in place. Will continue to monitor and review plan of care.       "

## 2018-10-11 NOTE — PROGRESS NOTES
PRIMARY DIAGNOSIS: GENERALIZED WEAKNESS     OUTPATIENT/OBSERVATION GOALS TO BE MET BEFORE DISCHARGE  1. Orthostatic performed: No     2. Tolerating PO medications: Yes     3. Return to near baseline physical activity: No     4. Cleared for discharge by consultants (if involved): No        Discharge Planner Nurse      Safe discharge environment identified: No  Barriers to discharge: Yes       Entered by: Lurdes Oseguera 10/11/2018 10:56 AM        Pt lethargic and oriented to self only. VSS, pt denies pain, headache, dizziness, & SOB. Pt reports nausea/stomach upset. Administered Zofran. Pt somewhat cooperative, very sleepy. Did stay awake long enough to eat some breakfast and took AM meds. , Novolog not available to administer for AM dose. PT was able to get patient to stand at bedside for a few minutes. Pt made a couple of derogatory racial comments to staff, but was redirectable. Will continue with plan of care.     Pt waiting for placement at new memory care unit (current facility refuses to take him back).    Family states that patient does have sleep apnea, but refused sleep study or using a C-Pap. Patient has allowed O2 nasual canula in past.     Please review provider order for any additional goals.   Nurse to notify provider when observation goals have been met and patient is ready for discharge.

## 2018-10-11 NOTE — PLAN OF CARE
Problem: Patient Care Overview  Goal: Plan of Care/Patient Progress Review  PT: Pt receiving meds this AM during set time, per RN pt not currently appropriate for PT eval, will check back as schedule allows

## 2018-10-11 NOTE — PROGRESS NOTES
"   10/11/18 1023   Quick Adds   Type of Visit Initial PT Evaluation   Living Environment   Lives With spouse   Living Arrangements assisted living   Home Accessibility no concerns   Living Environment Comment Hx obtained from chart review, pt lethargic with garbled speech, received Haldol prior to session.    Self-Care   Activity/Exercise/Self-Care Comment Unable to obtain hx 2/2 dementia, poor historian, lethargic. Per chart review pt will need alternate placement as pt agressive and hitting staff at [facility]   Functional Level Prior   Fall history within last six months yes   Prior Functional Level Comment Pt lethargic, has dementia, unable to obtain PLOF at this time. Per chart review pt has had frequent falls at [facility] and has had increasingly aggressive behavior. Contacted daughter Clara to gather PLOF, per daughter pt Mod I in room with FWW, toileting mod I. bathing with assist from staff. Would have meals delivered or walk down to dining mendiola. 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 The patient states he does not know why he is here. His only complaint is \"tailbone pain\" from a recent fall onto his \"butt.\" He can provide no information regarding the circumstances of this fall or when it occurred. Denies LOC or head injury. Denies CP or dyspnea. "   Precautions/Limitations fall precautions   General Observations RN present throughout evaluation    Cognitive Status Examination   Cognitive Comment Pt has progressed dementia, garbled speech, lethargic   Integumentary/Edema   Integumentary/Edema Comments laceration on knees, see nursing notes for details   Posture    Posture Forward head position   Range of Motion (ROM)   ROM Comment WFL   Strength   Strength Comments Gross deconditioning, pt has difficulty obtaining full BLE hip extension and knee extension, pt unable to clear LE from floor when engagede in side stepping, use of FWW.   Bed Mobility   Bed Mobility Comments Supine > sitting EOB with mod A x 1 and HOB elevated   Transfer Skills   Transfer Comments STS with FWW and mod A x 2 with FWW. Pt stood EOB for ~ 4 minutes    Gait   Gait Comments Not safe to initaite at this time   Balance   Balance Comments Pt requires BUE support on bed to maintain sitting balance. Close CGA standing with BUE support on FWW, unable to clear LE from floor   Sensory Examination   Sensory Perception Comments Unable to accurately assess    General Therapy Interventions   Planned Therapy Interventions bed mobility training;balance training;fine motor coordination training;strengthening;neuromuscular re-education;transfer training   Clinical Impression   Criteria for Skilled Therapeutic Intervention yes, treatment indicated   PT Diagnosis Decreased functional mobility fromf baseline    Influenced by the following impairments Dementia, PMH, age, gross deconditioning, lethargic    Functional limitations due to impairments see above   Clinical Presentation Unstable/Unpredictable   Clinical Presentation Rationale Age, cognition, fluctuating functional mobility    Clinical Decision Making (Complexity) High complexity   Therapy Frequency` 5 times/week   Predicted Duration of Therapy Intervention (days/wks) 1 week   Anticipated Discharge Disposition Transitional Care Facility  (TCU with  "memory care unit )   Risk & Benefits of therapy have been explained Yes   Patient, Family & other staff in agreement with plan of care Yes   [facility] AM-PAC  \"6 Clicks\" V.2 Basic Mobility Inpatient Short Form   1. Turning from your back to your side while in a flat bed without using bedrails? 3 - A Little   2. Moving from lying on your back to sitting on the side of a flat bed without using bedrails? 3 - A Little   3. Moving to and from a bed to a chair (including a wheelchair)? 3 - A Little   4. Standing up from a chair using your arms (e.g., wheelchair, or bedside chair)? 3 - A Little   5. To walk in hospital room? 2 - A Lot   6. Climbing 3-5 steps with a railing? 2 - A Lot   Basic Mobility Raw Score (Score out of 24.Lower scores equate to lower levels of function) 16   Total Evaluation Time   Total Evaluation Time (Minutes) 20     "

## 2018-10-12 LAB
GLUCOSE BLDC GLUCOMTR-MCNC: 127 MG/DL (ref 70–99)
GLUCOSE BLDC GLUCOMTR-MCNC: 128 MG/DL (ref 70–99)
GLUCOSE BLDC GLUCOMTR-MCNC: 129 MG/DL (ref 70–99)
GLUCOSE BLDC GLUCOMTR-MCNC: 176 MG/DL (ref 70–99)
GLUCOSE BLDC GLUCOMTR-MCNC: 260 MG/DL (ref 70–99)

## 2018-10-12 PROCEDURE — G0378 HOSPITAL OBSERVATION PER HR: HCPCS

## 2018-10-12 PROCEDURE — 99207 ZZC CDG-CHARGE REQUIRED MANUAL ENTRY: CPT | Performed by: INTERNAL MEDICINE

## 2018-10-12 PROCEDURE — A9270 NON-COVERED ITEM OR SERVICE: HCPCS | Mod: GY | Performed by: INTERNAL MEDICINE

## 2018-10-12 PROCEDURE — 25000132 ZZH RX MED GY IP 250 OP 250 PS 637: Mod: GY | Performed by: INTERNAL MEDICINE

## 2018-10-12 PROCEDURE — 40000901 ZZH STATISTIC WOC PT EDUCATION, 0-15 MIN: Performed by: NURSE PRACTITIONER

## 2018-10-12 PROCEDURE — 00000146 ZZHCL STATISTIC GLUCOSE BY METER IP

## 2018-10-12 PROCEDURE — G0463 HOSPITAL OUTPT CLINIC VISIT: HCPCS | Performed by: NURSE PRACTITIONER

## 2018-10-12 PROCEDURE — 12000007 ZZH R&B INTERMEDIATE

## 2018-10-12 PROCEDURE — 25000128 H RX IP 250 OP 636: Performed by: INTERNAL MEDICINE

## 2018-10-12 PROCEDURE — 99233 SBSQ HOSP IP/OBS HIGH 50: CPT | Performed by: INTERNAL MEDICINE

## 2018-10-12 RX ORDER — SODIUM CHLORIDE 9 MG/ML
INJECTION, SOLUTION INTRAVENOUS CONTINUOUS
Status: DISCONTINUED | OUTPATIENT
Start: 2018-10-12 | End: 2018-10-14

## 2018-10-12 RX ORDER — POLYETHYLENE GLYCOL 3350 17 G/17G
17 POWDER, FOR SOLUTION ORAL DAILY PRN
Status: DISCONTINUED | OUTPATIENT
Start: 2018-10-12 | End: 2018-10-21 | Stop reason: HOSPADM

## 2018-10-12 RX ADMIN — MELATONIN TAB 3 MG 5 MG: 3 TAB at 13:36

## 2018-10-12 RX ADMIN — VITAMIN D, TAB 1000IU (100/BT) 1000 UNITS: 25 TAB at 08:47

## 2018-10-12 RX ADMIN — METOPROLOL SUCCINATE 25 MG: 25 TABLET, EXTENDED RELEASE ORAL at 08:49

## 2018-10-12 RX ADMIN — SERTRALINE HYDROCHLORIDE 200 MG: 100 TABLET ORAL at 13:36

## 2018-10-12 RX ADMIN — SODIUM CHLORIDE: 9 INJECTION, SOLUTION INTRAVENOUS at 21:37

## 2018-10-12 RX ADMIN — SODIUM CHLORIDE 500 ML: 9 INJECTION, SOLUTION INTRAVENOUS at 15:35

## 2018-10-12 RX ADMIN — ASPIRIN 81 MG 81 MG: 81 TABLET ORAL at 08:49

## 2018-10-12 RX ADMIN — TIMOLOL MALEATE 1 DROP: 5 SOLUTION/ DROPS OPHTHALMIC at 08:55

## 2018-10-12 RX ADMIN — VITAMIN D, TAB 1000IU (100/BT) 1000 UNITS: 25 TAB at 20:24

## 2018-10-12 RX ADMIN — GABAPENTIN 100 MG: 100 CAPSULE ORAL at 08:49

## 2018-10-12 RX ADMIN — INSULIN ASPART 3 UNITS: 100 INJECTION, SOLUTION INTRAVENOUS; SUBCUTANEOUS at 17:21

## 2018-10-12 RX ADMIN — GABAPENTIN 100 MG: 100 CAPSULE ORAL at 20:24

## 2018-10-12 RX ADMIN — SODIUM CHLORIDE: 9 INJECTION, SOLUTION INTRAVENOUS at 16:33

## 2018-10-12 RX ADMIN — TICAGRELOR 90 MG: 90 TABLET ORAL at 08:49

## 2018-10-12 RX ADMIN — MELATONIN TAB 3 MG 5 MG: 3 TAB at 20:25

## 2018-10-12 RX ADMIN — HALOPERIDOL LACTATE 2 MG: 5 INJECTION, SOLUTION INTRAMUSCULAR at 14:24

## 2018-10-12 RX ADMIN — PRAVASTATIN SODIUM 40 MG: 20 TABLET ORAL at 08:48

## 2018-10-12 RX ADMIN — INSULIN ASPART 1 UNITS: 100 INJECTION, SOLUTION INTRAVENOUS; SUBCUTANEOUS at 14:26

## 2018-10-12 RX ADMIN — QUETIAPINE FUMARATE 25 MG: 25 TABLET ORAL at 20:25

## 2018-10-12 RX ADMIN — TICAGRELOR 90 MG: 90 TABLET ORAL at 20:25

## 2018-10-12 RX ADMIN — DICLOFENAC SODIUM 2 G: 10 GEL TOPICAL at 08:54

## 2018-10-12 RX ADMIN — QUETIAPINE FUMARATE 25 MG: 25 TABLET ORAL at 08:49

## 2018-10-12 RX ADMIN — METOPROLOL SUCCINATE 25 MG: 25 TABLET, EXTENDED RELEASE ORAL at 20:24

## 2018-10-12 RX ADMIN — DICLOFENAC SODIUM 2 G: 10 GEL TOPICAL at 17:21

## 2018-10-12 ASSESSMENT — ACTIVITIES OF DAILY LIVING (ADL)
ADLS_ACUITY_SCORE: 16

## 2018-10-12 NOTE — PROGRESS NOTES
[facility]    Hospitalist Progress Note  Name: Cecil Martinez    MRN: [id number]  Provider: Kristina Arambula MD  Date of Service: 10/12/2018    Assessment & Plan   Summary of Stay: Cecil Martinez is a 88 year old male who was admitted on 10/10/2018 with agitation and altered mental status.  Past medical history significant for dementia, lives in a memory unit, history of CVA, arthritis, hypertension, depression was brought to the emergency room with acute worsening of dementia as patient was aggressive towards his wife.    Acute exacerbation of severe dementia  --On as needed Seroquel  --Admitted for observation  --Social work for placement at appropriate level of care  --We will consult psych to help with his superimposed depression and agitation    Diabetes mellitus  --Sliding scale insulin    History of CVA  --Resumed statins and aspirin  --CT head negative    Hyperlipidemia  --On statins    Chronic toe wound  --We will consult wound care nurse    Poor oral intake poor urine output  --Started on IV fluids  --Monitor I's and O's  --We will check basic metabolic panel in a.m.      DVT Prophylaxis: Pneumatic Compression Devices  Code Status: DNR/DNI    Disposition: Expected discharge in 1-2 days to appropriate long-term placement      Interval History   Patient required antipsychotic, is somnolent this morning. patient not talking much quiet offered no complaints was standing and trying to help work with PT. able to get detailed review of systems    Discussed patient's placement and behavior with patient's daughter at length.  Per daughter he is angry at her.  She is also concerned that patient had similar episodes with prior strokes.  Patient is concerned about his living arrangement as he was aggressive towards their mom    -Data reviewed today: I reviewed all new labs and imaging reports over the last 24 hours. I personally reviewed no images or EKG's today.    Physical Exam   Temp: 97.1  F  (36.2  C) Temp src: Oral BP: (!) 175/95   Heart Rate: 66 Resp: 20 SpO2: 96 % O2 Device: Nasal cannula Oxygen Delivery: 2 LPM  Vitals:    10/10/18 1915   Weight: 88.6 kg (195 lb 4 oz)     Vital Signs with Ranges  Temp:  [95.2  F (35.1  C)-98.7  F (37.1  C)] 97.1  F (36.2  C)  Heart Rate:  [65-70] 66  Resp:  [20-22] 20  BP: (140-183)/(64-95) 175/95  SpO2:  [88 %-97 %] 96 %  I/O last 3 completed shifts:  In: 360 [P.O.:360]  Out: 400 [Urine:400]      GEN:  Alert, oriented x 3, appears comfortable, NAD.  HEENT:  Normocephalic/atraumatic, no scleral icterus, no nasal discharge, mouth moist.  CV:  Regular rate and rhythm, no murmur or JVD.  S1 + S2 noted, no S3 or S4.  LUNGS:  Clear to auscultation bilaterally without rales/rhonchi/wheezing/retractions.  Symmetric chest rise on inhalation noted.  ABD:  Active bowel sounds, soft, non-tender/non-distended.  No rebound/guarding/rigidity.  EXT:  No edema.  No cyanosis.  Chronic toe wound  SKIN:  Dry to touch,    Medications       aspirin  81 mg Oral Daily     cholecalciferol  1,000 Units Oral BID     diclofenac  2 g Topical 4x Daily     gabapentin  100 mg Oral BID     insulin aspart  1-7 Units Subcutaneous TID AC     insulin aspart  1-5 Units Subcutaneous At Bedtime     melatonin  5 mg Oral BID     metoprolol succinate  25 mg Oral BID     OLANZapine  2.5 mg Intramuscular Once     pravastatin  40 mg Oral Daily     QUEtiapine  25 mg Oral BID     sertraline  200 mg Oral Daily     ticagrelor  90 mg Oral BID     timolol  1 drop Both Eyes BID     Data       Recent Labs  Lab 10/10/18  1410   WBC 8.8   HGB 13.2*   HCT 40.8   MCV 98          Recent Labs  Lab 10/10/18  1410      POTASSIUM 4.4   CHLORIDE 101   CO2 30   ANIONGAP 6   *   BUN 20   CR 1.25   GFRESTIMATED 54*   GFRESTBLACK 66   GRISEL 8.5     No results for input(s): AST, ALT, GGT, ALKPHOS, BILITOTAL, BILICONJ, BILIDIRECT, JASMYNE in the last 168 hours.    Invalid input(s): BILIRUBININDIRECT    Recent Labs  Lab  10/10/18  1410   INR 1.02     No results for input(s): LACT in the last 168 hours.  No results for input(s): TSH in the last 168 hours.  No results for input(s): TROPONIN, TROPI, TROPR in the last 168 hours.    Invalid input(s): TROP, TROPONINIES    Recent Labs  Lab 10/10/18  1639   COLOR Yellow   APPEARANCE Clear   URINEGLC Negative   URINEBILI Negative   URINEKETONE Negative   SG 1.015   UBLD Negative   URINEPH 6.0   PROTEIN 100*   NITRITE Negative   LEUKEST Negative   RBCU 1   WBCU 2       No results found for this or any previous visit (from the past 24 hour(s)).

## 2018-10-12 NOTE — PLAN OF CARE
Problem: Patient Care Overview  Goal: Plan of Care/Patient Progress Review  PRIMARY DIAGNOSIS: Aggression  OUTPATIENT/OBSERVATION GOALS TO BE MET BEFORE DISCHARGE:  1. ADLs back to baseline: Yes    2. Activity and level of assistance: 2 Assist     3. Pain status: Pain free.    4. Return to near baseline physical activity: No     Discharge Planner Nurse   Safe discharge environment identified: No, social work consult.   Barriers to discharge: No       Entered by: Yesenia Ku 10/12/2018 6:16 AM     Please review provider order for any additional goals.   Nurse to notify provider when observation goals have been met and patient is ready for discharge.

## 2018-10-12 NOTE — PROGRESS NOTES
[facility]  WO Nurse Inpatient Wound Assessment     Assessment of wound(s) on pt's:   Right 2nd toe        Data:   Patient History:   Per MD note, pt is a  88 year old male who was admitted on 10/10/2018 with agitation and altered mental status.  Hx of dementia, lives in a memory unit, history of CVA, arthritis, hypertension, depression.          Current Diet / Nutrition:           Active Diet Order      Moderate Consistent CHO Diet            Josue Assessment and sub scores:   Josue Score  Avg: 15  Min: 14  Max: 16     Labs:         Recent Labs   Lab Test  10/10/18   1410 08/29/18   ALBUMIN   --   2.8*   HGB  13.2*  12.4*   RBC  4.18*  3.92*   WBC  8.8  7.8   PLT  296  276   INR  1.02   --    A1C  7.7*  7.4*          Wound Assessment (location #1  Right second toe):     Wound History: Unknown, pt with dementia   Specific Dimensions (length x width x depth, in cm):   0.3 x 0.8 cm, thin eschar    Tunneling:  N/A      Undermining: N/A    Wound Base: dry and eschar     Palpation of the wound bed: dry eschar    Slough appearance:  none         Eschar appearance:  dry and brown    Periwound Skin: intact,      Color: normal and consistent with surrounding tissue    Temperature  normal     Drainage:  none     Odor: none    Pain: one          Intervention:     Patient's chart evaluated.      Wound(s) was assessed    Wound Care: was done:  Cleansed, bandaids applied    Orders  Written    Supplies  gathered    Discussed plan of care with Nurse          Assessment:       Stable thin eschar right second toe. Bandaid is appropriate to protect.         Plan:     Nursing to notify the Provider(s) and re-consult the WOC Nurse if wound(s) deteriorate(s) or if the wound care plan needs reevaluation.    Plan of care for wound located on Right 2nd toe    WOC Nurse will return: 1 week     Face to face time: <15 Minutes

## 2018-10-12 NOTE — PLAN OF CARE
"Problem: Patient Care Overview  Goal: Plan of Care/Patient Progress Review  Outcome: No Change  VS stable. A & O x 1, only alert to self. Patient crabby, but cooperative. Has not needed any PRN agitation medications. Will follow commands and is redirectable. Sitter at bedside  Diet:mod cho.   Ambulates:with assist of 1-2. At times using mechanical lift depending on if patient is alert.   IV meds:saline locked.   B, 153  Pain:denies.   Abnormal assessments: patient has busies/scabs from frequent falls at home.    Intake & output: patient did not void this shift, however only drank 120 ml. Patient voided very frequently on day shift. Bladder scanned- 78 ml   Discharge plan: 1-2 days. Will continue to monitor and review plan of care.   PRIMARY DIAGNOSIS: \"GENERIC\" NURSING  OUTPATIENT/OBSERVATION GOALS TO BE MET BEFORE DISCHARGE:  1. ADLs back to baseline: No    2. Activity and level of assistance: Up with maximum assistance. Consider SW and/or PT evaluation.     3. Pain status: Pain free.    4. Return to near baseline physical activity: No     Discharge Planner Nurse   Safe discharge environment identified: No, waiting on SW to see to make sure patient is safe at home and so is wife.  Barriers to discharge: Yes       Entered by: Perla Meyer 10/11/2018 10:50 PM     Please review provider order for any additional goals.   Nurse to notify provider when observation goals have been met and patient is ready for discharge.  "

## 2018-10-12 NOTE — CONSULTS
**Delayed note due to to EPIC down time yesterday.    Care Transition Initial Assessment - SW  Reason For Consult: discharge planning  Met with: Family  - Pt's dtr Clara (TITUS)  Active Problems:    Agitation       DATA  Lives With: facility resident  Living Arrangements: assisted living - Did live at [facility] ([phone]), but they refuse to take pt back due to behaviors.  Description of Support System: Involved, Supportive  Who is your support system?: Children - Dtr Clara (TITUS) who lives local, dtr Brice - involved and supportive, and a dtr Maribeth lives in [facility] (Is coming to see the pt over the weekend).  Support Assessment: Adequate family and caregiver support.  Identified issues/concerns regarding health management: Pt was admitted for agitation.      Resources List: Skilled Nursing Facility - New LTC/MC placement will be needed at time of discharge.        Transportation Available: van, wheelchair accessible:  Clara stated the family will not be able to transport the pt.  HE wheelchair was discussed.  Dtr is aware and acknowledges the costs of transport.  Stretcher transport was discussed as well as a back up.    ASSESSMENT  Cognitive Status:  confused  Concerns to be addressed: Pt will need placed in a LTC/MC facility.  Clara would like the local area, but said that a niece is willing to visit the pt if there is a facility in the [facility].  Pt currently has a VPM and was switched to inpatient from Obs.         PLAN  Sw will continue with discharge planning and be available as needed.

## 2018-10-12 NOTE — PLAN OF CARE
PRIMARY DIAGNOSIS: Aggression  OUTPATIENT/OBSERVATION GOALS TO BE MET BEFORE DISCHARGE:  1. ADLs back to baseline: Yes     2. Activity and level of assistance: 2 Assist                 3. Pain status: Pain free.     4. Return to near baseline physical activity: No      Discharge Planner Nurse   Safe discharge environment identified: No, social work consult.   Barriers to discharge: No       Entered by: Yesenia Ku 10/12/2018 6:16 AM  Please review provider order for any additional goals.   Nurse to notify provider when observation goals have been met and patient is ready for discharge.  Alert to self. Cooperative. Denies pain. VSS. Slept through the night. 2 assist with ambulation. 0200 .
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
## 2018-10-12 NOTE — PROGRESS NOTES
D:  Pt was admitted to inpatient from Obs.  Pt's discharge date is unknown at this time.  Pt has a VPM and cannot be discharged to a LTC facility until 24 hours free of a VPM or sitter.    I:  Jermaine spoke with pt's dtr Clara regarding discharge planning to identify facilities to send referrals to.  [facility], [facility], [facility], and [facility] were identified.  Referrals were sent.  Clara also stated that referrals can be sent to the [place] due to her having a niece there who would visit the pt.  Jermaine spoke with Jeannie Martínez from [facility] ([phone]) who said she spoke with pt's dtr Clara about finding placement for the pt.  Jeannie said she will call around and see if she can find placement for the pt as well as she has helped them find placement in the past.      A/P:  Sw will continue with discharge planning and be available as needed.

## 2018-10-13 LAB
ANION GAP SERPL CALCULATED.3IONS-SCNC: 8 MMOL/L (ref 3–14)
BUN SERPL-MCNC: 17 MG/DL (ref 7–30)
CALCIUM SERPL-MCNC: 8 MG/DL (ref 8.5–10.1)
CHLORIDE SERPL-SCNC: 105 MMOL/L (ref 94–109)
CO2 SERPL-SCNC: 24 MMOL/L (ref 20–32)
CREAT SERPL-MCNC: 1.15 MG/DL (ref 0.66–1.25)
GFR SERPL CREATININE-BSD FRML MDRD: 60 ML/MIN/1.7M2
GLUCOSE BLDC GLUCOMTR-MCNC: 143 MG/DL (ref 70–99)
GLUCOSE BLDC GLUCOMTR-MCNC: 150 MG/DL (ref 70–99)
GLUCOSE BLDC GLUCOMTR-MCNC: 160 MG/DL (ref 70–99)
GLUCOSE BLDC GLUCOMTR-MCNC: 192 MG/DL (ref 70–99)
GLUCOSE SERPL-MCNC: 139 MG/DL (ref 70–99)
POTASSIUM SERPL-SCNC: 4.3 MMOL/L (ref 3.4–5.3)
SODIUM SERPL-SCNC: 137 MMOL/L (ref 133–144)

## 2018-10-13 PROCEDURE — 25000128 H RX IP 250 OP 636: Performed by: INTERNAL MEDICINE

## 2018-10-13 PROCEDURE — 12000000 ZZH R&B MED SURG/OB

## 2018-10-13 PROCEDURE — 80048 BASIC METABOLIC PNL TOTAL CA: CPT | Performed by: INTERNAL MEDICINE

## 2018-10-13 PROCEDURE — 25000132 ZZH RX MED GY IP 250 OP 250 PS 637: Mod: GY | Performed by: INTERNAL MEDICINE

## 2018-10-13 PROCEDURE — 99207 ZZC CDG-MDM COMPONENT: MEETS LOW - DOWN CODED: CPT | Performed by: INTERNAL MEDICINE

## 2018-10-13 PROCEDURE — 90791 PSYCH DIAGNOSTIC EVALUATION: CPT | Performed by: PSYCHOLOGIST

## 2018-10-13 PROCEDURE — 00000146 ZZHCL STATISTIC GLUCOSE BY METER IP

## 2018-10-13 PROCEDURE — 99207 ZZC MOONLIGHTING INDICATOR: CPT | Performed by: INTERNAL MEDICINE

## 2018-10-13 PROCEDURE — A9270 NON-COVERED ITEM OR SERVICE: HCPCS | Mod: GY | Performed by: INTERNAL MEDICINE

## 2018-10-13 PROCEDURE — 99232 SBSQ HOSP IP/OBS MODERATE 35: CPT | Performed by: INTERNAL MEDICINE

## 2018-10-13 PROCEDURE — 36415 COLL VENOUS BLD VENIPUNCTURE: CPT | Performed by: INTERNAL MEDICINE

## 2018-10-13 RX ADMIN — MELATONIN TAB 3 MG 5 MG: 3 TAB at 14:14

## 2018-10-13 RX ADMIN — HALOPERIDOL LACTATE 2 MG: 5 INJECTION, SOLUTION INTRAMUSCULAR at 22:41

## 2018-10-13 RX ADMIN — METOPROLOL SUCCINATE 25 MG: 25 TABLET, EXTENDED RELEASE ORAL at 09:14

## 2018-10-13 RX ADMIN — QUETIAPINE FUMARATE 25 MG: 25 TABLET ORAL at 09:14

## 2018-10-13 RX ADMIN — QUETIAPINE FUMARATE 25 MG: 25 TABLET ORAL at 20:44

## 2018-10-13 RX ADMIN — INSULIN ASPART 1 UNITS: 100 INJECTION, SOLUTION INTRAVENOUS; SUBCUTANEOUS at 09:22

## 2018-10-13 RX ADMIN — INSULIN ASPART 2 UNITS: 100 INJECTION, SOLUTION INTRAVENOUS; SUBCUTANEOUS at 14:17

## 2018-10-13 RX ADMIN — DICLOFENAC SODIUM 2 G: 10 GEL TOPICAL at 09:22

## 2018-10-13 RX ADMIN — GABAPENTIN 100 MG: 100 CAPSULE ORAL at 20:44

## 2018-10-13 RX ADMIN — SERTRALINE HYDROCHLORIDE 200 MG: 100 TABLET ORAL at 14:14

## 2018-10-13 RX ADMIN — HALOPERIDOL LACTATE 2 MG: 5 INJECTION, SOLUTION INTRAMUSCULAR at 16:26

## 2018-10-13 RX ADMIN — TIMOLOL MALEATE 1 DROP: 5 SOLUTION/ DROPS OPHTHALMIC at 20:49

## 2018-10-13 RX ADMIN — METOPROLOL SUCCINATE 25 MG: 25 TABLET, EXTENDED RELEASE ORAL at 20:44

## 2018-10-13 RX ADMIN — SODIUM CHLORIDE: 9 INJECTION, SOLUTION INTRAVENOUS at 17:08

## 2018-10-13 RX ADMIN — TICAGRELOR 90 MG: 90 TABLET ORAL at 20:44

## 2018-10-13 RX ADMIN — INSULIN ASPART 1 UNITS: 100 INJECTION, SOLUTION INTRAVENOUS; SUBCUTANEOUS at 18:26

## 2018-10-13 RX ADMIN — TIMOLOL MALEATE 1 DROP: 5 SOLUTION/ DROPS OPHTHALMIC at 09:16

## 2018-10-13 RX ADMIN — VITAMIN D, TAB 1000IU (100/BT) 1000 UNITS: 25 TAB at 09:14

## 2018-10-13 RX ADMIN — GABAPENTIN 100 MG: 100 CAPSULE ORAL at 09:14

## 2018-10-13 RX ADMIN — PRAVASTATIN SODIUM 40 MG: 20 TABLET ORAL at 09:13

## 2018-10-13 RX ADMIN — SODIUM CHLORIDE: 9 INJECTION, SOLUTION INTRAVENOUS at 07:26

## 2018-10-13 RX ADMIN — ASPIRIN 81 MG 81 MG: 81 TABLET ORAL at 09:14

## 2018-10-13 RX ADMIN — MELATONIN TAB 3 MG 5 MG: 3 TAB at 20:43

## 2018-10-13 RX ADMIN — TICAGRELOR 90 MG: 90 TABLET ORAL at 09:14

## 2018-10-13 ASSESSMENT — ACTIVITIES OF DAILY LIVING (ADL)
ADLS_ACUITY_SCORE: 26

## 2018-10-13 NOTE — PROGRESS NOTES
[facility]    Hospitalist Progress Note    Assessment & Plan   88 year old male who was admitted on 10/10/2018 with agitation and altered mental status.  Past medical history significant for dementia, lives in a memory unit, history of CVA, arthritis, hypertension, depression was brought to the emergency room with acute worsening of dementia as patient was aggressive towards his wife.     Acute exacerbation of severe dementia  --On as needed Seroquel  --Admitted for observation  --Social work for placement at appropriate level of care  --We will consult psych to help with his superimposed depression and agitation     Diabetes mellitus  --Sliding scale insulin     History of CVA  --Resumed statins and aspirin  --CT head negative     Hyperlipidemia  --On statins     Chronic toe wound  --We will consult wound care nurse     Poor oral intake poor urine output  --Started on IV fluids  --Monitor I's and O's  --We will check basic metabolic panel in a.m.     DVT Prophylaxis: Pneumatic Compression Devices  Code Status: DNR/DNI     Disposition: Expected discharge in 1-2 days to appropriate long-term placement    Wander Gonzalez MD   Text Page (7am to 6pm)    Interval History   A&O to self only, cooperative    -Data reviewed today: I reviewed all new labs and imaging results over the last 24 hours. I personally reviewed no images or EKG's today.    Physical Exam   Temp: 98.4  F (36.9  C) Temp src: Oral BP: 175/72   Heart Rate: 66 Resp: 18 SpO2: 95 % O2 Device: None (Room air)    Vitals:    10/10/18 1915   Weight: 88.6 kg (195 lb 4 oz)     Vital Signs with Ranges  Temp:  [96.5  F (35.8  C)-99  F (37.2  C)] 98.4  F (36.9  C)  Heart Rate:  [62-75] 66  Resp:  [16-20] 18  BP: (160-178)/(62-94) 175/72  SpO2:  [92 %-95 %] 95 %  I/O last 3 completed shifts:  In: 2326 [P.O.:710; I.V.:1616]  Out: -     GEN: NAD, pleasant  HEENT: no icterus  CV: RRR, normal s1/s2, no murmurs/rubs/s3/s4, no heave. JVP normal.  CHEST: CTAB  ABD:  soft, NT/ND, NABS  : no flank/suprapubic tenderness  NEURO: AA&Ox3, fluent/appropriate, motor grossly nonfocal  PSYCH: cooperative, affect appropriate    Medications     sodium chloride 100 mL/hr at 10/13/18 0726       aspirin  81 mg Oral Daily     cholecalciferol  1,000 Units Oral BID     diclofenac  2 g Topical 4x Daily     gabapentin  100 mg Oral BID     insulin aspart  1-7 Units Subcutaneous TID AC     insulin aspart  1-5 Units Subcutaneous At Bedtime     melatonin  5 mg Oral BID     metoprolol succinate  25 mg Oral BID     OLANZapine  2.5 mg Intramuscular Once     pravastatin  40 mg Oral Daily     QUEtiapine  25 mg Oral BID     sertraline  200 mg Oral Daily     ticagrelor  90 mg Oral BID     timolol  1 drop Both Eyes BID       Data     Recent Labs  Lab 10/13/18  0608 10/10/18  1410   WBC  --  8.8   HGB  --  13.2*   MCV  --  98   PLT  --  296   INR  --  1.02    137   POTASSIUM 4.3 4.4   CHLORIDE 105 101   CO2 24 30   BUN 17 20   CR 1.15 1.25   ANIONGAP 8 6   GRISEL 8.0* 8.5   * 178*       Imaging:   No results found for this or any previous visit (from the past 24 hour(s)).

## 2018-10-13 NOTE — PLAN OF CARE
Problem: Fall Risk (Adult)  Goal: Identify Related Risk Factors and Signs and Symptoms  Related risk factors and signs and symptoms are identified upon initiation of Human Response Clinical Practice Guideline (CPG).   Outcome: Improving  Assumed care from 2300 to 0730  A&O to self only, cooperative, VPM & bed alarm in place for safety  up Ax1 w/GB & walker to bathroom  LDA: PIV infusing  Vitals: BP elevated 170s/80s, RA  Pain: denies  , mod carb diet  Skin: Bruising & scabbing throughout, R toe wound CDI  GI/: Incont urine   Followed by SW & WOCN  Plan: Psych appt @ 11 am  Will continue to monitor

## 2018-10-13 NOTE — PLAN OF CARE
"Problem: Patient Care Overview  Goal: Plan of Care/Patient Progress Review  Outcome: Improving  Elevated blood pressure due to agitation, morning blood pressure medications given. Alert only to self. VPM and bed/chair alarm in use. Up to chair for meals. Up with 1-2. NS at 100 ml/hr. Patient aggigated and angry this morning because per patient \"family wasn't here and wasn't allowed to visit\". Plan: looking for placement. Possibly discharge in 1-2 days.       "

## 2018-10-13 NOTE — PROGRESS NOTES
MARICARMEN spoke with pt's daughter Clara per her request.  Clara states she is not allowed to visit her dad due to his anger towards her.  Daughter tearful as she explains the three daughters try so hard to help him and their mother who has dementia but his outbursts have become so much more significant with the increased number of strokes he has had.  Daughter said her parents were together at [facility] and now he cannot return due to his behaviors.  Pt's wife's will remain at [facility] as her behavior is appropriate but family now aware that no longer will parents be able to live with each other.  Daughter concerned pt will be evicted from next placement as well with his out-of-control outbursts.   MARICARMEN discussed with daughter additional referrals will be made and she states she understood that choice of area may not be possible.
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
## 2018-10-13 NOTE — PLAN OF CARE
Problem: Patient Care Overview  Goal: Plan of Care/Patient Progress Review  Patient alert to name, video monitoring for safety. Set off alarm when needs to void but goes slowly when getting up. A1 with walker. Pt angry and agitated most of the shift but not aggressive and does follow directions.IV fluids for poor po but did eat 75% of small dinner, drank 300ml. BP elevated, given routine BP meds. Needs LTC or another living facility at discharge.

## 2018-10-14 LAB
ANION GAP SERPL CALCULATED.3IONS-SCNC: 7 MMOL/L (ref 3–14)
BUN SERPL-MCNC: 16 MG/DL (ref 7–30)
CALCIUM SERPL-MCNC: 8.2 MG/DL (ref 8.5–10.1)
CHLORIDE SERPL-SCNC: 107 MMOL/L (ref 94–109)
CO2 SERPL-SCNC: 24 MMOL/L (ref 20–32)
CREAT SERPL-MCNC: 1.09 MG/DL (ref 0.66–1.25)
ERYTHROCYTE [DISTWIDTH] IN BLOOD BY AUTOMATED COUNT: 14.1 % (ref 10–15)
GFR SERPL CREATININE-BSD FRML MDRD: 64 ML/MIN/1.7M2
GLUCOSE BLDC GLUCOMTR-MCNC: 134 MG/DL (ref 70–99)
GLUCOSE BLDC GLUCOMTR-MCNC: 141 MG/DL (ref 70–99)
GLUCOSE BLDC GLUCOMTR-MCNC: 151 MG/DL (ref 70–99)
GLUCOSE BLDC GLUCOMTR-MCNC: 158 MG/DL (ref 70–99)
GLUCOSE BLDC GLUCOMTR-MCNC: 203 MG/DL (ref 70–99)
GLUCOSE SERPL-MCNC: 152 MG/DL (ref 70–99)
HCT VFR BLD AUTO: 37.9 % (ref 40–53)
HGB BLD-MCNC: 12.3 G/DL (ref 13.3–17.7)
MCH RBC QN AUTO: 31.2 PG (ref 26.5–33)
MCHC RBC AUTO-ENTMCNC: 32.5 G/DL (ref 31.5–36.5)
MCV RBC AUTO: 96 FL (ref 78–100)
PLATELET # BLD AUTO: 254 10E9/L (ref 150–450)
POTASSIUM SERPL-SCNC: 4 MMOL/L (ref 3.4–5.3)
RBC # BLD AUTO: 3.94 10E12/L (ref 4.4–5.9)
SODIUM SERPL-SCNC: 138 MMOL/L (ref 133–144)
WBC # BLD AUTO: 7.9 10E9/L (ref 4–11)

## 2018-10-14 PROCEDURE — A9270 NON-COVERED ITEM OR SERVICE: HCPCS | Mod: GY | Performed by: INTERNAL MEDICINE

## 2018-10-14 PROCEDURE — 36415 COLL VENOUS BLD VENIPUNCTURE: CPT | Performed by: INTERNAL MEDICINE

## 2018-10-14 PROCEDURE — 00000146 ZZHCL STATISTIC GLUCOSE BY METER IP

## 2018-10-14 PROCEDURE — 85027 COMPLETE CBC AUTOMATED: CPT | Performed by: INTERNAL MEDICINE

## 2018-10-14 PROCEDURE — 25000132 ZZH RX MED GY IP 250 OP 250 PS 637: Mod: GY | Performed by: INTERNAL MEDICINE

## 2018-10-14 PROCEDURE — 99232 SBSQ HOSP IP/OBS MODERATE 35: CPT | Performed by: INTERNAL MEDICINE

## 2018-10-14 PROCEDURE — 80048 BASIC METABOLIC PNL TOTAL CA: CPT | Performed by: INTERNAL MEDICINE

## 2018-10-14 PROCEDURE — 12000000 ZZH R&B MED SURG/OB

## 2018-10-14 RX ORDER — METOPROLOL SUCCINATE 100 MG/1
100 TABLET, EXTENDED RELEASE ORAL 2 TIMES DAILY
Status: DISCONTINUED | OUTPATIENT
Start: 2018-10-14 | End: 2018-10-20

## 2018-10-14 RX ORDER — METOPROLOL TARTRATE 50 MG
50 TABLET ORAL ONCE
Status: COMPLETED | OUTPATIENT
Start: 2018-10-14 | End: 2018-10-14

## 2018-10-14 RX ORDER — QUETIAPINE FUMARATE 25 MG/1
25 TABLET, FILM COATED ORAL EVERY MORNING
Status: DISCONTINUED | OUTPATIENT
Start: 2018-10-15 | End: 2018-10-19

## 2018-10-14 RX ADMIN — VITAMIN D, TAB 1000IU (100/BT) 1000 UNITS: 25 TAB at 21:55

## 2018-10-14 RX ADMIN — ACETAMINOPHEN 650 MG: 325 TABLET, FILM COATED ORAL at 21:56

## 2018-10-14 RX ADMIN — GABAPENTIN 100 MG: 100 CAPSULE ORAL at 09:28

## 2018-10-14 RX ADMIN — QUETIAPINE FUMARATE 25 MG: 25 TABLET ORAL at 09:28

## 2018-10-14 RX ADMIN — TIMOLOL MALEATE 1 DROP: 5 SOLUTION/ DROPS OPHTHALMIC at 22:01

## 2018-10-14 RX ADMIN — MELATONIN TAB 3 MG 5 MG: 3 TAB at 21:54

## 2018-10-14 RX ADMIN — TICAGRELOR 90 MG: 90 TABLET ORAL at 21:55

## 2018-10-14 RX ADMIN — PRAVASTATIN SODIUM 40 MG: 20 TABLET ORAL at 09:28

## 2018-10-14 RX ADMIN — TIMOLOL MALEATE 1 DROP: 5 SOLUTION/ DROPS OPHTHALMIC at 09:29

## 2018-10-14 RX ADMIN — TICAGRELOR 90 MG: 90 TABLET ORAL at 09:29

## 2018-10-14 RX ADMIN — METOPROLOL SUCCINATE 100 MG: 100 TABLET, EXTENDED RELEASE ORAL at 21:54

## 2018-10-14 RX ADMIN — DICLOFENAC SODIUM 2 G: 10 GEL TOPICAL at 09:29

## 2018-10-14 RX ADMIN — ASPIRIN 81 MG 81 MG: 81 TABLET ORAL at 09:29

## 2018-10-14 RX ADMIN — INSULIN ASPART 2 UNITS: 100 INJECTION, SOLUTION INTRAVENOUS; SUBCUTANEOUS at 13:20

## 2018-10-14 RX ADMIN — METOPROLOL SUCCINATE 25 MG: 25 TABLET, EXTENDED RELEASE ORAL at 09:28

## 2018-10-14 RX ADMIN — SERTRALINE HYDROCHLORIDE 200 MG: 100 TABLET ORAL at 13:26

## 2018-10-14 RX ADMIN — METOPROLOL TARTRATE 50 MG: 50 TABLET, FILM COATED ORAL at 14:29

## 2018-10-14 RX ADMIN — GABAPENTIN 100 MG: 100 CAPSULE ORAL at 22:00

## 2018-10-14 RX ADMIN — METFORMIN HYDROCHLORIDE 500 MG: 500 TABLET, FILM COATED ORAL at 18:33

## 2018-10-14 RX ADMIN — Medication 37.5 MG: at 21:55

## 2018-10-14 RX ADMIN — MELATONIN TAB 3 MG 5 MG: 3 TAB at 13:26

## 2018-10-14 RX ADMIN — DICLOFENAC SODIUM 2 G: 10 GEL TOPICAL at 13:20

## 2018-10-14 RX ADMIN — VITAMIN D, TAB 1000IU (100/BT) 1000 UNITS: 25 TAB at 09:28

## 2018-10-14 ASSESSMENT — ACTIVITIES OF DAILY LIVING (ADL)
ADLS_ACUITY_SCORE: 26
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
## 2018-10-14 NOTE — PLAN OF CARE
Problem: Patient Care Overview  Goal: Plan of Care/Patient Progress Review  Outcome: No Change  Patient alert to self only. VPM and bed/chair alarm remain in place. Lung sounds diminished. Blood sugars 134 and 203. Up with 1. Ambulated in hallway x 1 with staff. Has prn medications if needed for agitation and aggression. Plan: continue looking for placement.

## 2018-10-14 NOTE — PLAN OF CARE
"Problem: Patient Care Overview  Goal: Plan of Care/Patient Progress Review  Outcome: No Change  Pt alert and orientated initially to self only, throughout the night he was aware of self, place and situation.  Stating \"I need to get back to my my wife\". VPM in room and bed alarm on. VSS but BP elevated after ambulating to bathroom.  Denies pain.  Lungs dim t/o on room air.  +4 BS, ROHIT las BM.  Tolerating Mod Cho diet.  0200 blood sugar was 151.  Voiding in the bathroom but wearing brief, can be incontinent at times.  Up with 2 assist gb/ww for staff safety. Has wound on right 2nd toe which requires daily dressing changes, has bandaid on currently.  Not assessed as patient was agitated earlier in the evening, kicking staff.  Plan: VPM and bed alarm for safety. Psych consult, WOC following.  SW working on placement.  Monitor for aggression and agitation, has PRN meds if needed.  Will continue to monitor this pt.        "
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
## 2018-10-14 NOTE — PROGRESS NOTES
Received phone call from [facility].  Pt appears appropriate but will contact  on Monday regarding admission.

## 2018-10-15 LAB
GLUCOSE BLDC GLUCOMTR-MCNC: 122 MG/DL (ref 70–99)
GLUCOSE BLDC GLUCOMTR-MCNC: 139 MG/DL (ref 70–99)
GLUCOSE BLDC GLUCOMTR-MCNC: 153 MG/DL (ref 70–99)
GLUCOSE BLDC GLUCOMTR-MCNC: 161 MG/DL (ref 70–99)
GLUCOSE BLDC GLUCOMTR-MCNC: 182 MG/DL (ref 70–99)

## 2018-10-15 PROCEDURE — A9270 NON-COVERED ITEM OR SERVICE: HCPCS | Mod: GY | Performed by: INTERNAL MEDICINE

## 2018-10-15 PROCEDURE — 93005 ELECTROCARDIOGRAM TRACING: CPT

## 2018-10-15 PROCEDURE — 25000132 ZZH RX MED GY IP 250 OP 250 PS 637: Mod: GY | Performed by: INTERNAL MEDICINE

## 2018-10-15 PROCEDURE — 99207 ZZC NON-BILLABLE SERV PER CHARTING: CPT | Performed by: PSYCHOLOGIST

## 2018-10-15 PROCEDURE — 40000893 ZZH STATISTIC PT IP EVAL DEFER

## 2018-10-15 PROCEDURE — 93010 ELECTROCARDIOGRAM REPORT: CPT | Performed by: INTERNAL MEDICINE

## 2018-10-15 PROCEDURE — 00000146 ZZHCL STATISTIC GLUCOSE BY METER IP

## 2018-10-15 PROCEDURE — 99223 1ST HOSP IP/OBS HIGH 75: CPT | Performed by: CLINICAL NURSE SPECIALIST

## 2018-10-15 PROCEDURE — A9270 NON-COVERED ITEM OR SERVICE: HCPCS | Mod: GY | Performed by: HOSPITALIST

## 2018-10-15 PROCEDURE — 40000275 ZZH STATISTIC RCP TIME EA 10 MIN

## 2018-10-15 PROCEDURE — 25000132 ZZH RX MED GY IP 250 OP 250 PS 637: Mod: GY | Performed by: HOSPITALIST

## 2018-10-15 PROCEDURE — 12000000 ZZH R&B MED SURG/OB

## 2018-10-15 PROCEDURE — 99233 SBSQ HOSP IP/OBS HIGH 50: CPT | Performed by: HOSPITALIST

## 2018-10-15 PROCEDURE — G0463 HOSPITAL OUTPT CLINIC VISIT: HCPCS

## 2018-10-15 RX ORDER — MIRTAZAPINE 15 MG/1
15 TABLET, FILM COATED ORAL AT BEDTIME
Status: DISCONTINUED | OUTPATIENT
Start: 2018-10-15 | End: 2018-10-21 | Stop reason: HOSPADM

## 2018-10-15 RX ORDER — GABAPENTIN 100 MG/1
200 CAPSULE ORAL 3 TIMES DAILY
Status: DISCONTINUED | OUTPATIENT
Start: 2018-10-15 | End: 2018-10-21 | Stop reason: HOSPADM

## 2018-10-15 RX ORDER — OLANZAPINE 2.5 MG/1
2.5 TABLET, FILM COATED ORAL EVERY 8 HOURS PRN
Status: DISCONTINUED | OUTPATIENT
Start: 2018-10-15 | End: 2018-10-21 | Stop reason: HOSPADM

## 2018-10-15 RX ADMIN — QUETIAPINE FUMARATE 25 MG: 25 TABLET ORAL at 09:17

## 2018-10-15 RX ADMIN — ASPIRIN 81 MG 81 MG: 81 TABLET ORAL at 09:17

## 2018-10-15 RX ADMIN — TIMOLOL MALEATE 1 DROP: 5 SOLUTION/ DROPS OPHTHALMIC at 21:21

## 2018-10-15 RX ADMIN — GABAPENTIN 200 MG: 100 CAPSULE ORAL at 21:25

## 2018-10-15 RX ADMIN — TICAGRELOR 90 MG: 90 TABLET ORAL at 21:26

## 2018-10-15 RX ADMIN — VITAMIN D, TAB 1000IU (100/BT) 1000 UNITS: 25 TAB at 09:30

## 2018-10-15 RX ADMIN — MELATONIN TAB 3 MG 5 MG: 3 TAB at 21:30

## 2018-10-15 RX ADMIN — GABAPENTIN 200 MG: 100 CAPSULE ORAL at 12:07

## 2018-10-15 RX ADMIN — PRAVASTATIN SODIUM 40 MG: 20 TABLET ORAL at 09:17

## 2018-10-15 RX ADMIN — TICAGRELOR 90 MG: 90 TABLET ORAL at 09:17

## 2018-10-15 RX ADMIN — GABAPENTIN 200 MG: 100 CAPSULE ORAL at 17:22

## 2018-10-15 RX ADMIN — Medication 37.5 MG: at 21:23

## 2018-10-15 RX ADMIN — MIRTAZAPINE 15 MG: 15 TABLET, FILM COATED ORAL at 21:27

## 2018-10-15 RX ADMIN — INSULIN ASPART 1 UNITS: 100 INJECTION, SOLUTION INTRAVENOUS; SUBCUTANEOUS at 18:34

## 2018-10-15 RX ADMIN — GABAPENTIN 100 MG: 100 CAPSULE ORAL at 09:17

## 2018-10-15 RX ADMIN — MELATONIN TAB 3 MG 5 MG: 3 TAB at 12:07

## 2018-10-15 RX ADMIN — VITAMIN D, TAB 1000IU (100/BT) 1000 UNITS: 25 TAB at 21:29

## 2018-10-15 RX ADMIN — METFORMIN HYDROCHLORIDE 500 MG: 500 TABLET, FILM COATED ORAL at 09:17

## 2018-10-15 RX ADMIN — METOPROLOL SUCCINATE 100 MG: 100 TABLET, EXTENDED RELEASE ORAL at 09:17

## 2018-10-15 RX ADMIN — SERTRALINE HYDROCHLORIDE 200 MG: 100 TABLET ORAL at 12:07

## 2018-10-15 RX ADMIN — METFORMIN HYDROCHLORIDE 500 MG: 500 TABLET, FILM COATED ORAL at 17:22

## 2018-10-15 RX ADMIN — METOPROLOL SUCCINATE 100 MG: 100 TABLET, EXTENDED RELEASE ORAL at 21:28

## 2018-10-15 ASSESSMENT — ACTIVITIES OF DAILY LIVING (ADL)
ADLS_ACUITY_SCORE: 26
ADLS_ACUITY_SCORE: 25

## 2018-10-15 NOTE — CONSULTS
"This document was created with voice recognition software.  As result, there may be errors in grammar and syntax.  Please consider this when reading this document.    Reason for consult  This is a follow up Psychiatric Consultation under the supervision of Dr. Roach, ordered by Dr. Soria. This consult follows up on an initial  previous consult completed on 10-13-18. This follow up consultation took over 60 minutes.     I was asked to see Cecil to follow-up on the previous consultation and to help assess potential medication adjustments to help manage dementia symptoms, particularly confusion and agitation/aggression.    Records reviewed   I reviewed the most recent hospitalist note.  I also reviewed the nursing note from yesterday, which documents that the patient continues to be confused, and \"remains angry and verbally abusive and threatening violence, but has not attempted to hit or injure staff.\"  Social work staff continue to work on placement.    I reviewed Dr. Roach's medication recommendations, documented in my previous report, and note that none of these changes had been implemented.    I also conferred with social work staff.  It is her understanding that potential discharge placement options will require that Cecil not to have video monitoring for 24 hours, and to be safe.    Behavioral Assessment Update  Cecil was finishing his breakfast, in a chair in his room, when I reintroduced myself.  He continues to be monitored by remote video monitoring.  He had some difficulty managing his food and saliva, and there was food debris on his chin.  He did not seem aware of this.  He did not recall seeing me previously, and was adamant about this.  He initially was cranky, complained that \"this place is a shit hole!\" and told me to \"go away!\"     He did, however let me continue to engage him and fairly quickly he opened up about his sense of being abandoned by his family.  He became flooded with despair, was " "tearful and even sobbed at times, and had difficulty talking but eventually was able to regain his composure.  He told me, with deep sadness, that he believest his family has not visited him here.  Staff informed me, however, that family visited yesterday.  He also is very troubled by the fact that his wife has not visited him here.    At one point he did say, with deep sadness, \"all I want is my wife.\"    Collateral Information  I spoke with Cecil's 2 daughters, Jeannie and Maribeth, by phone, for about 30 minutes.  They clearly are highly concerned about their father, but also confused about his treatment needs and what they can do to help him.      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 They relocated their parents into a memory care center a few weeks ago.  This transition was very difficult for Cecil.  He became agitated when they visited, and staff eventually recommended that they not visit, which has contributed unfortunately to his sense of abandonment by them.      Also, he has been confused about his wife's behavior He told me that he fears that she has been \"running around with other men.\"  He appears to have confusion about what it means when she is taken out of their room by staff, and he finds himself alone and does not recall the circumstances of his wife being away from him.    I recommended that they try Alzheimer's caregiver support group, and they were very receptive to this.    Risk update  He continues to be too frail and disorganized to be a danger to himself.       Team Consultation  I conferred remotely with Dr. Roach, who amended her previous recommendations to: 1.  Increase gabapentin to 200 mg 3 times daily.  2.  Continue Zoloft.  3.  Continue Seroquel, but add Zyprexa, 5 mg, every 8 hours, as needed to help manage agitation and anxiety.  4.  Add " Remeron, 15 mg, at bedtime, as needed, again to help with agitation and anxiety, and also may help with his mood.    I notified Dr. Hill that the report is complete and Dr. Roach's recommendations are in the report.    Diagnoses    unchanged     Recommendations   1.  If the team agrees, it would be advisable to discontinue the video monitoring and to monitor Cecil more frequently than usual to establish whether he will be safe without video monitoring  2.  I added more behavioral management recommendations in the team sticky notes section  3. The [facility] Psychiatric Consult Team is available to follow-up, if needed; this will need to be ordered.      Jet [email or web address].[email or web address], L.P.  [phone]

## 2018-10-15 NOTE — PLAN OF CARE
Problem: Patient Care Overview  Goal: Plan of Care/Patient Progress Review  Outcome: No Change  A&O x 1, disoriented to time, place & situation, assist x 1 w/walker & gait belt, carb count diet, , pt slept most of shift, will continue with POC.
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
## 2018-10-15 NOTE — PLAN OF CARE
Problem: Patient Care Overview  Goal: Plan of Care/Patient Progress Review  Discharge Planner PT   Patient plan for discharge: Per chart LTC/memory care  Current status: Per chart, patient is consistently mobilizing with Ax1-2 with a walker with RN staff. Per chart, patient is behavioral and often aggressive. Patient is from memory care with plans to return to another care facility. There does not appear to be a skilled rehab need at this time. Per chart, PT consulted due to a fall PTA. Recommend RN and other care staff to continue to provide assist and use the walker. Per discussion with SW, referrals have already been placed to LTC facilities.    Barriers to return to prior living situation: N/A  Recommendations for discharge: Care facility with assist for all mobility  and use of walker.   Rationale for recommendations: There does not appear to be a skilled rehab need at this time. Safe discharge planning already in place. Will complete IP PT order.        Entered by: Sheng Herrera 10/15/2018 11:00 AM
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
## 2018-10-15 NOTE — PROGRESS NOTES
D:  Pt's discharge is unknown at this time - pt has a VPM.  Pt's discharge recommendation is to a LTC/MC facility.  Due to behaviors, the pt is unable to return to his prior living arrangement at this time.    I:  Jermaine spoke with [facility] ([phone]) who said that they do not have an appropriate bed for the pt at this time.  Vinita with [facility] ([phone]) wanted to know if the pt was paying privately or not at his private LTC facility.  Sw and Palliative Care had a meeting with the pt's sisters Clara ([phone]) and Maribeth.  Goals of care were discussed as well as placement options.  Clara states she is confused about everything and doesn't know what to do or what is best for the pt.  Clara states that the pt told her that if she doesn't bring her mother next time, not to come back.  Clara is concerned that a new placement may be detrimental to the pt, but she knows that he can't go back to [facility] and she is concerned about her mother's safety if he can go back.  Clara said that the pt was private pay at the other facility, but does have a LTC policy with a 60 day delay.  Clara and Maribeth were updated that the [facility] and [facility] are both following the pt for discharge placement, but that he needs to be without the VPM for 24 hours.  Palliative is going to follow-up to see if the pt can get a neuro-psych consult.      A/P:  Jermaine will continue with discharge planning and be available as needed.  Jermaine will follow-up with Vinita regarding the pt's pay status at his previous LTC/MC facility.

## 2018-10-16 LAB
ANION GAP SERPL CALCULATED.3IONS-SCNC: 7 MMOL/L (ref 3–14)
BUN SERPL-MCNC: 19 MG/DL (ref 7–30)
CALCIUM SERPL-MCNC: 8.2 MG/DL (ref 8.5–10.1)
CHLORIDE SERPL-SCNC: 105 MMOL/L (ref 94–109)
CO2 SERPL-SCNC: 27 MMOL/L (ref 20–32)
CREAT SERPL-MCNC: 1.19 MG/DL (ref 0.66–1.25)
ERYTHROCYTE [DISTWIDTH] IN BLOOD BY AUTOMATED COUNT: 14.4 % (ref 10–15)
GFR SERPL CREATININE-BSD FRML MDRD: 58 ML/MIN/1.7M2
GLUCOSE BLDC GLUCOMTR-MCNC: 114 MG/DL (ref 70–99)
GLUCOSE BLDC GLUCOMTR-MCNC: 150 MG/DL (ref 70–99)
GLUCOSE BLDC GLUCOMTR-MCNC: 162 MG/DL (ref 70–99)
GLUCOSE BLDC GLUCOMTR-MCNC: 190 MG/DL (ref 70–99)
GLUCOSE BLDC GLUCOMTR-MCNC: 192 MG/DL (ref 70–99)
GLUCOSE SERPL-MCNC: 132 MG/DL (ref 70–99)
HCT VFR BLD AUTO: 37.5 % (ref 40–53)
HGB BLD-MCNC: 11.9 G/DL (ref 13.3–17.7)
MCH RBC QN AUTO: 30.9 PG (ref 26.5–33)
MCHC RBC AUTO-ENTMCNC: 31.7 G/DL (ref 31.5–36.5)
MCV RBC AUTO: 97 FL (ref 78–100)
PLATELET # BLD AUTO: 276 10E9/L (ref 150–450)
POTASSIUM SERPL-SCNC: 4 MMOL/L (ref 3.4–5.3)
RBC # BLD AUTO: 3.85 10E12/L (ref 4.4–5.9)
SODIUM SERPL-SCNC: 139 MMOL/L (ref 133–144)
WBC # BLD AUTO: 8.3 10E9/L (ref 4–11)

## 2018-10-16 PROCEDURE — 40000915 ZZH STATISTIC SITTER, EVENING HOURS

## 2018-10-16 PROCEDURE — 25000132 ZZH RX MED GY IP 250 OP 250 PS 637: Mod: GY | Performed by: INTERNAL MEDICINE

## 2018-10-16 PROCEDURE — A9270 NON-COVERED ITEM OR SERVICE: HCPCS | Mod: GY | Performed by: INTERNAL MEDICINE

## 2018-10-16 PROCEDURE — 36415 COLL VENOUS BLD VENIPUNCTURE: CPT | Performed by: HOSPITALIST

## 2018-10-16 PROCEDURE — 80048 BASIC METABOLIC PNL TOTAL CA: CPT | Performed by: HOSPITALIST

## 2018-10-16 PROCEDURE — 12000000 ZZH R&B MED SURG/OB

## 2018-10-16 PROCEDURE — 99233 SBSQ HOSP IP/OBS HIGH 50: CPT | Performed by: HOSPITALIST

## 2018-10-16 PROCEDURE — 25000128 H RX IP 250 OP 636: Performed by: INTERNAL MEDICINE

## 2018-10-16 PROCEDURE — 25000128 H RX IP 250 OP 636: Performed by: HOSPITALIST

## 2018-10-16 PROCEDURE — A9270 NON-COVERED ITEM OR SERVICE: HCPCS | Mod: GY | Performed by: HOSPITALIST

## 2018-10-16 PROCEDURE — 25000132 ZZH RX MED GY IP 250 OP 250 PS 637: Mod: GY | Performed by: HOSPITALIST

## 2018-10-16 PROCEDURE — 00000146 ZZHCL STATISTIC GLUCOSE BY METER IP

## 2018-10-16 PROCEDURE — 85027 COMPLETE CBC AUTOMATED: CPT | Performed by: HOSPITALIST

## 2018-10-16 PROCEDURE — 99233 SBSQ HOSP IP/OBS HIGH 50: CPT | Performed by: CLINICAL NURSE SPECIALIST

## 2018-10-16 RX ORDER — LORAZEPAM 2 MG/ML
2 INJECTION INTRAMUSCULAR ONCE
Status: COMPLETED | OUTPATIENT
Start: 2018-10-16 | End: 2018-10-16

## 2018-10-16 RX ORDER — HALOPERIDOL 5 MG/ML
2 INJECTION INTRAMUSCULAR EVERY 6 HOURS PRN
Status: DISCONTINUED | OUTPATIENT
Start: 2018-10-16 | End: 2018-10-17

## 2018-10-16 RX ORDER — HALOPERIDOL 5 MG/ML
5 INJECTION INTRAMUSCULAR EVERY 6 HOURS PRN
Status: DISCONTINUED | OUTPATIENT
Start: 2018-10-16 | End: 2018-10-17

## 2018-10-16 RX ADMIN — DICLOFENAC SODIUM 2 G: 10 GEL TOPICAL at 12:13

## 2018-10-16 RX ADMIN — MELATONIN TAB 3 MG 5 MG: 3 TAB at 13:06

## 2018-10-16 RX ADMIN — PRAVASTATIN SODIUM 40 MG: 20 TABLET ORAL at 09:07

## 2018-10-16 RX ADMIN — SERTRALINE HYDROCHLORIDE 200 MG: 100 TABLET ORAL at 13:06

## 2018-10-16 RX ADMIN — QUETIAPINE FUMARATE 25 MG: 25 TABLET ORAL at 09:08

## 2018-10-16 RX ADMIN — METFORMIN HYDROCHLORIDE 500 MG: 500 TABLET, FILM COATED ORAL at 09:08

## 2018-10-16 RX ADMIN — TICAGRELOR 90 MG: 90 TABLET ORAL at 09:07

## 2018-10-16 RX ADMIN — TIMOLOL MALEATE 1 DROP: 5 SOLUTION/ DROPS OPHTHALMIC at 09:17

## 2018-10-16 RX ADMIN — DICLOFENAC SODIUM 2 G: 10 GEL TOPICAL at 09:18

## 2018-10-16 RX ADMIN — HALOPERIDOL LACTATE 2 MG: 5 INJECTION, SOLUTION INTRAMUSCULAR at 17:19

## 2018-10-16 RX ADMIN — GABAPENTIN 200 MG: 100 CAPSULE ORAL at 09:07

## 2018-10-16 RX ADMIN — LORAZEPAM 2 MG: 2 INJECTION INTRAMUSCULAR; INTRAVENOUS at 17:19

## 2018-10-16 RX ADMIN — ACETAMINOPHEN 650 MG: 325 TABLET, FILM COATED ORAL at 09:07

## 2018-10-16 RX ADMIN — METOPROLOL SUCCINATE 100 MG: 100 TABLET, EXTENDED RELEASE ORAL at 09:08

## 2018-10-16 RX ADMIN — VITAMIN D, TAB 1000IU (100/BT) 1000 UNITS: 25 TAB at 09:08

## 2018-10-16 RX ADMIN — ASPIRIN 81 MG 81 MG: 81 TABLET ORAL at 09:08

## 2018-10-16 RX ADMIN — INSULIN ASPART 2 UNITS: 100 INJECTION, SOLUTION INTRAVENOUS; SUBCUTANEOUS at 09:15

## 2018-10-16 ASSESSMENT — ACTIVITIES OF DAILY LIVING (ADL)
ADLS_ACUITY_SCORE: 26
ADLS_ACUITY_SCORE: 26
ADLS_ACUITY_SCORE: 25
ADLS_ACUITY_SCORE: 26
ADLS_ACUITY_SCORE: 26
ADLS_ACUITY_SCORE: 16
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
## 2018-10-16 NOTE — PROGRESS NOTES
D:  Pt's discharge plan is to LTC/ facility once placement is found.    I:  Sw spoke with pt's family who stated that they were told about a residential care facility in [place] that  Someone they know told them about.  Family never gave the information of the facility to sw.  Sw contacted [facility] to see if they have any male LTC beds and they reported that they do not.  Family was updated on the status of the referrals and that [facility] is currently not accepting him due to his behaviors - if placement is still needed, they will relook at the pt in a couple of days.    A/P:  Sw will continue with discharge planning and be available as needed.

## 2018-10-16 NOTE — PLAN OF CARE
Problem: Patient Care Overview  Goal: Plan of Care/Patient Progress Review  Outcome: No Change  Patient confused, cooperative this shift  No reports of pain  Tolerating diet, no nausea or vomiting  Up assist standby  Peripheral IV saline locked  No BM this shift  VSS, on room air  Took medications orally without issue, can also take with pudding  Continue with plan of care

## 2018-10-16 NOTE — PLAN OF CARE
Problem: Patient Care Overview  Goal: Plan of Care/Patient Progress Review  Outcome: Improving  A&O x 1, disoriented to place, time & situation, stand by assist, carb count diet,  after having apple juice and crackers, pt didn't have any agitation during shift, did talk with patient for awhile about his situation, IV pulled out by accident, possible discharge today so did not place IV again, no tele, did have urine output, will continue to monitor.

## 2018-10-16 NOTE — PLAN OF CARE
"Problem: Patient Care Overview  Goal: Plan of Care/Patient Progress Review  Outcome: No Change  Orientation: alert and oriented to self only.  Vss afebrile. 95%  LS:clear  GI: bs+, tolerating mod cho diet. No bm this shift.  : voiding in toilet/ incont at times too.  Skin:dry skin, redness mini scab on right leg. Right toe drsg changed per WOC nurse.  Activity: up with assist of 1, walker and gait belt  Pain: denies pain.  Plan: continue current meds for mood. No iv in place. Pt tearful about not seeing wife. Pt's 2 daughter here today. Pt became angry when unable to see wife. Pt thinks there is something \"fishy\" going on here. Pt got a little frustrated staff and questionable attempted to swing at staff, chair alarm was going off during this event. Waiting for placement match.        "
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
## 2018-10-17 LAB
ANION GAP SERPL CALCULATED.3IONS-SCNC: 10 MMOL/L (ref 3–14)
BUN SERPL-MCNC: 20 MG/DL (ref 7–30)
CALCIUM SERPL-MCNC: 8.1 MG/DL (ref 8.5–10.1)
CHLORIDE SERPL-SCNC: 106 MMOL/L (ref 94–109)
CO2 SERPL-SCNC: 24 MMOL/L (ref 20–32)
CREAT SERPL-MCNC: 1.17 MG/DL (ref 0.66–1.25)
ERYTHROCYTE [DISTWIDTH] IN BLOOD BY AUTOMATED COUNT: 14.2 % (ref 10–15)
GFR SERPL CREATININE-BSD FRML MDRD: 59 ML/MIN/1.7M2
GLUCOSE BLDC GLUCOMTR-MCNC: 119 MG/DL (ref 70–99)
GLUCOSE BLDC GLUCOMTR-MCNC: 128 MG/DL (ref 70–99)
GLUCOSE BLDC GLUCOMTR-MCNC: 181 MG/DL (ref 70–99)
GLUCOSE BLDC GLUCOMTR-MCNC: 256 MG/DL (ref 70–99)
GLUCOSE SERPL-MCNC: 140 MG/DL (ref 70–99)
HCT VFR BLD AUTO: 38.3 % (ref 40–53)
HGB BLD-MCNC: 12.4 G/DL (ref 13.3–17.7)
LACTATE BLD-SCNC: 0.8 MMOL/L (ref 0.7–2)
MCH RBC QN AUTO: 31.4 PG (ref 26.5–33)
MCHC RBC AUTO-ENTMCNC: 32.4 G/DL (ref 31.5–36.5)
MCV RBC AUTO: 97 FL (ref 78–100)
PLATELET # BLD AUTO: 276 10E9/L (ref 150–450)
POTASSIUM SERPL-SCNC: 4.1 MMOL/L (ref 3.4–5.3)
RBC # BLD AUTO: 3.95 10E12/L (ref 4.4–5.9)
SODIUM SERPL-SCNC: 140 MMOL/L (ref 133–144)
WBC # BLD AUTO: 6.5 10E9/L (ref 4–11)

## 2018-10-17 PROCEDURE — 99207 ZZC NON-BILLABLE SERV PER CHARTING: CPT | Performed by: PSYCHOLOGIST

## 2018-10-17 PROCEDURE — 36415 COLL VENOUS BLD VENIPUNCTURE: CPT | Performed by: HOSPITALIST

## 2018-10-17 PROCEDURE — 40000914 ZZH STATISTIC SITTER, DAY HOURS

## 2018-10-17 PROCEDURE — 40000275 ZZH STATISTIC RCP TIME EA 10 MIN

## 2018-10-17 PROCEDURE — 99233 SBSQ HOSP IP/OBS HIGH 50: CPT | Performed by: HOSPITALIST

## 2018-10-17 PROCEDURE — 25000132 ZZH RX MED GY IP 250 OP 250 PS 637: Mod: GY | Performed by: INTERNAL MEDICINE

## 2018-10-17 PROCEDURE — A9270 NON-COVERED ITEM OR SERVICE: HCPCS | Mod: GY | Performed by: INTERNAL MEDICINE

## 2018-10-17 PROCEDURE — 80048 BASIC METABOLIC PNL TOTAL CA: CPT | Performed by: HOSPITALIST

## 2018-10-17 PROCEDURE — A9270 NON-COVERED ITEM OR SERVICE: HCPCS | Mod: GY | Performed by: HOSPITALIST

## 2018-10-17 PROCEDURE — 85027 COMPLETE CBC AUTOMATED: CPT | Performed by: HOSPITALIST

## 2018-10-17 PROCEDURE — 12000000 ZZH R&B MED SURG/OB

## 2018-10-17 PROCEDURE — 93010 ELECTROCARDIOGRAM REPORT: CPT | Performed by: INTERNAL MEDICINE

## 2018-10-17 PROCEDURE — 83605 ASSAY OF LACTIC ACID: CPT | Performed by: HOSPITALIST

## 2018-10-17 PROCEDURE — 25000132 ZZH RX MED GY IP 250 OP 250 PS 637: Mod: GY | Performed by: HOSPITALIST

## 2018-10-17 PROCEDURE — 25000128 H RX IP 250 OP 636: Performed by: INTERNAL MEDICINE

## 2018-10-17 PROCEDURE — 93005 ELECTROCARDIOGRAM TRACING: CPT

## 2018-10-17 PROCEDURE — 00000146 ZZHCL STATISTIC GLUCOSE BY METER IP

## 2018-10-17 RX ORDER — HALOPERIDOL 0.5 MG/1
2 TABLET ORAL 2 TIMES DAILY
Status: DISCONTINUED | OUTPATIENT
Start: 2018-10-17 | End: 2018-10-21 | Stop reason: HOSPADM

## 2018-10-17 RX ORDER — OLANZAPINE 2.5 MG/1
5 TABLET, FILM COATED ORAL AT BEDTIME
Status: DISCONTINUED | OUTPATIENT
Start: 2018-10-17 | End: 2018-10-21 | Stop reason: HOSPADM

## 2018-10-17 RX ORDER — HALOPERIDOL 5 MG/ML
2 INJECTION INTRAMUSCULAR EVERY 6 HOURS PRN
Status: DISCONTINUED | OUTPATIENT
Start: 2018-10-17 | End: 2018-10-21 | Stop reason: HOSPADM

## 2018-10-17 RX ADMIN — OLANZAPINE 5 MG: 2.5 TABLET, FILM COATED ORAL at 21:11

## 2018-10-17 RX ADMIN — ASPIRIN 81 MG 81 MG: 81 TABLET ORAL at 09:59

## 2018-10-17 RX ADMIN — GABAPENTIN 200 MG: 100 CAPSULE ORAL at 17:03

## 2018-10-17 RX ADMIN — VITAMIN D, TAB 1000IU (100/BT) 1000 UNITS: 25 TAB at 09:59

## 2018-10-17 RX ADMIN — HALOPERIDOL 2 MG: 0.5 TABLET ORAL at 13:37

## 2018-10-17 RX ADMIN — HALOPERIDOL 2 MG: 0.5 TABLET ORAL at 21:11

## 2018-10-17 RX ADMIN — PRAVASTATIN SODIUM 40 MG: 20 TABLET ORAL at 09:58

## 2018-10-17 RX ADMIN — SERTRALINE HYDROCHLORIDE 200 MG: 100 TABLET ORAL at 13:36

## 2018-10-17 RX ADMIN — INSULIN ASPART 1 UNITS: 100 INJECTION, SOLUTION INTRAVENOUS; SUBCUTANEOUS at 13:41

## 2018-10-17 RX ADMIN — METFORMIN HYDROCHLORIDE 500 MG: 500 TABLET, FILM COATED ORAL at 09:59

## 2018-10-17 RX ADMIN — HALOPERIDOL LACTATE 2 MG: 5 INJECTION, SOLUTION INTRAMUSCULAR at 08:08

## 2018-10-17 RX ADMIN — GABAPENTIN 200 MG: 100 CAPSULE ORAL at 21:11

## 2018-10-17 RX ADMIN — INSULIN ASPART 3 UNITS: 100 INJECTION, SOLUTION INTRAVENOUS; SUBCUTANEOUS at 17:11

## 2018-10-17 RX ADMIN — DICLOFENAC SODIUM 2 G: 10 GEL TOPICAL at 17:03

## 2018-10-17 RX ADMIN — Medication 37.5 MG: at 20:40

## 2018-10-17 RX ADMIN — TIMOLOL MALEATE 1 DROP: 5 SOLUTION/ DROPS OPHTHALMIC at 10:04

## 2018-10-17 RX ADMIN — QUETIAPINE FUMARATE 25 MG: 25 TABLET ORAL at 09:59

## 2018-10-17 RX ADMIN — GABAPENTIN 200 MG: 100 CAPSULE ORAL at 09:59

## 2018-10-17 RX ADMIN — METOPROLOL SUCCINATE 100 MG: 100 TABLET, EXTENDED RELEASE ORAL at 20:40

## 2018-10-17 RX ADMIN — MELATONIN TAB 3 MG 5 MG: 3 TAB at 20:39

## 2018-10-17 RX ADMIN — OLANZAPINE 2.5 MG: 2.5 TABLET, FILM COATED ORAL at 05:49

## 2018-10-17 RX ADMIN — TICAGRELOR 90 MG: 90 TABLET ORAL at 20:40

## 2018-10-17 RX ADMIN — TICAGRELOR 90 MG: 90 TABLET ORAL at 10:04

## 2018-10-17 RX ADMIN — MIRTAZAPINE 15 MG: 15 TABLET, FILM COATED ORAL at 21:11

## 2018-10-17 RX ADMIN — MELATONIN TAB 3 MG 5 MG: 3 TAB at 13:37

## 2018-10-17 RX ADMIN — TIMOLOL MALEATE 1 DROP: 5 SOLUTION/ DROPS OPHTHALMIC at 20:39

## 2018-10-17 RX ADMIN — METOPROLOL SUCCINATE 100 MG: 100 TABLET, EXTENDED RELEASE ORAL at 09:59

## 2018-10-17 RX ADMIN — VITAMIN D, TAB 1000IU (100/BT) 1000 UNITS: 25 TAB at 20:40

## 2018-10-17 RX ADMIN — METFORMIN HYDROCHLORIDE 500 MG: 500 TABLET, FILM COATED ORAL at 17:03

## 2018-10-17 ASSESSMENT — ACTIVITIES OF DAILY LIVING (ADL)
ADLS_ACUITY_SCORE: 16
ADLS_ACUITY_SCORE: 16
ADLS_ACUITY_SCORE: 26
ADLS_ACUITY_SCORE: 26
ADLS_ACUITY_SCORE: 16
ADLS_ACUITY_SCORE: 16

## 2018-10-17 NOTE — PROGRESS NOTES
"CLINICAL NUTRITION SERVICES  -  ASSESSMENT NOTE      Malnutrition:   % Weight Loss:> 7.5% in 3 months (severe malnutrition)  Muscle Loss: Mild noted in temporal, clavicle, shoulder, hands, calves (did not complete full exam due to aggressive behaviors)    Malnutrition Diagnosis: Non-Severe malnutrition  In Context of:  Chronic illness or disease     REASON FOR ASSESSMENT  Cecil Martinez is a 88 year old male seen by the dietitian for LOS    NUTRITION HISTORY  - Information obtained from chart review - family in care conference (per personal ) and patient with dementia history (and history of multiple CVA's, aggressive behavior, R toe chronic wound)  - Food allergies/intolerances: NKFA  - Patient is on a ?consistent carb, finger food diet at [facility].    CURRENT NUTRITION ORDERS  - Diet: Moderate consistent carb, finger foods and paper plates  - Supplement: none  Current Intake/Tolerance:  - Per flow sheet review, % intake for documented meals. Personal  noted pt agreeable to milkshakes  - Factors affecting nutrition intake include: agitation, progressive disease  Palliative following: no artificial nutrition     PHYSICAL FINDINGS  Observed  Mild muscle loss related to sarcopenia  Obtained from Chart/Interdisciplinary Team  Josue nutrition score: 3; total score: 19  Malnutrition  Aggressive behavior    ANTHROPOMETRICS  Height: 5'9\"  Weight: 88 kg   Body mass index is 28.83 kg/(m^2).  Weight Status:  Overweight BMI 25-29.9  Ideal body weight: 72.7 kg +/- 10%, 122% of IBW   Weight History:  9% weight loss in~3  months, as noted below  Wt Readings from Last 10 Encounters:   10/10/18 88.6 kg (195 lb 4 oz)   09/22/18 90.7 kg (200 lb)   08/28/18 98.9 kg (218 lb)   08/07/18 97.1 kg (214 lb)       ASSESSED NUTRITION NEEDS (PER APPROVED PRACTICE GUIDELINES, Dosing weight: 77 kg Adjbw):  Estimated Energy Needs: 9696-5026 kcals (25-30 Kcal/Kg)  Justification: " maintenance  Estimated Protein Needs:  grams protein (1.2-1.5 g pro/Kg)  Justification: preservation of lean body mass  Estimated Fluid Needs: >1 mL/Kcal  Justification: maintenance    LABS  Labs reviewed    Recent Labs   Lab Test  10/17/18   0653  10/16/18   0743  10/14/18   0710  10/13/18   0608  10/10/18   1410   POTASSIUM  4.1  4.0  4.0  4.3  4.4     No results for input(s): PHOS in the last 96000 hours.  No results for input(s): MAG in the last 03220 hours.  Recent Labs   Lab Test  10/17/18   0653  10/16/18   0743  10/14/18   0710  10/13/18   0608  10/10/18   1410   NA  140  139  138  137  137     Recent Labs   Lab Test  10/17/18   0653  10/16/18   0743  10/14/18   0710  10/13/18   0608  10/10/18   1410   CR  1.17  1.19  1.09  1.15  1.25       Recent Labs  Lab 10/17/18  0653 10/16/18  0743 10/14/18  0710 10/13/18  0608 10/10/18  1410   * 132* 152* 139* 178*     Lab Results   Component Value Date    A1C 7.7 10/10/2018    A1C 7.4 08/29/2018       MEDICATIONS  Medications reviewed    MALNUTRITION:  % Weight Loss:> 7.5% in 3 months (severe malnutrition)  % Intake: Unable to determine  Subcutaneous Fat Loss:None observed  Muscle Loss: Mild noted in temporal, clavicle, shoulder, hands, calves (did not complete full exam due to aggressive behaviors)  Fluid Retention:None noted    Malnutrition Diagnosis: Non-Severe malnutrition  In Context of:  Chronic illness or disease    NUTRITION DIAGNOSIS:  Malnutrition related to ?decline of oral intakes with progressive disease (dementia) as evidenced by 9% weight loss in <3 months, muscle loss    INTERVENTIONS  Recommendations / Nutrition Prescription  Continue diet as ordered per MD discretion    Implementation  Nutrition education: Not appropriate at this time due to patient condition  Collaboration and Referral of care: Discussed patient during interdisciplinary care rounds this morning    Goals  Patient to eat meals TID      MONITORING AND EVALUATION:  Progress  towards goals will be monitored and evaluated per protocol and Practice Guidelines      Kierra Farrar RDN, DEBBIE, CNSC  Pager - 3rd floor/ICU: [phone]  Pager - All other floors: [phone]  Pager - Weekend/holiday: [phone]  Office: [phone]
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
## 2018-10-17 NOTE — PLAN OF CARE
Problem: Patient Care Overview  Goal: Plan of Care/Patient Progress Review  Outcome: Improving  Pt alert to name only. 5 point restraints removed this morning at 815. Pt more agreeable today and not violent or aggressive.. Uses some profanities to nurses. Sitter discontinues at 12:30 with bed/chair alarm only. Attempted to get up from chair but stayed in place when alarm rang. Ate 50% of meal. Daughter updated on conditon POC by nurse and SW.
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
## 2018-10-17 NOTE — PLAN OF CARE
Restraints removed per protocol, pt is sedated from medications given by nurse from prior shift, pt not combative or aggressive.

## 2018-10-17 NOTE — PROGRESS NOTES
"[facility]    Palliative Care Chart Check or Chart Review    This patient's most recent hospitalist note, most recent consultant notes, medication profile and labs from the past 24 hours have been reviewed.    Noted family has talked to Nursing, Social work and Dr. Terry Bonilla from mental health.  Patient calmer today, but still with one episode of aggression./combative behavior.  Medication adjustments per Mental health.  Family presently not in room.  Family has a tendency to monopolize staff per nursing and time spent does not seem to benefit as much as anticipated.  Nurse aware through phone contact that ur service is available if needed for family, staff support and for patient.   [place]  database review: no controlled substance in past 12 months   0 mg Daily Morphine Equivalent based on amount dispensed.  Opioids MME past 24 hours= 0 MME    Recommendation:   It has been determined that no change is necessary to the current plan of care at this time.   1.Decisional Capacity -  Unreliable. Patient has an advance directive dated 6/16/2005. 11/23/2013 pt completed a new HCD \"5 wishes\" that daughter Clara brought to hospital today.   Pt's primary HCPOA is his wife Chel Martinez (Margie), however Tara does not have capacity to function as HCA due to dementia and lives in memory care per family. His alternate HCPOA is his daughter Clara Camilo. Pt's second alternate listed in 11/23/2013 document is his daughter Katerina Martinez in [place], [place].  2. Pain- Healing toe wound s/p 2 vascular interventions at [facility], [place] , [place]. Arthritis.  - Tylenol 650 mg PO/TN q 4 hours prn.  - PTA voltaren 1% to joints 4 times per day.  3. Delirium in context of dementia with agitation and aggressive behaviors, recent CVAs - Nursing delirium interventions as indicated. Pt is [facility]. 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 "  10/15 Psychiatry has made recommendations again today for pharmacologic and non-pharmacologic interventions. Haldol 2 mg bid  And every 6 hrs prn, mirtazepine at HS prn, seroquel 37.5 mg q evening, seroquel 25 mg q am, zyprexa 2.5 mg q 8 hours prn agitation, Brilinta 90 mg  Bid zoloft 200 mg daily, gabapentin 200mg TID.  All antipsychotics, antidepressant and mood stablizing agent ( gabapentin)  per hospitalist/psychiatry.  1-/16 Daughters given dementia resources regarding pt care and communication strategies, disease trajectory, and for caregiver supports. Appreciate insights given by Dr. Terry Bonilla to pt's daughters  regarding communication strategies, behavior management.  4. Deconditioning - Nursing to ambulate pt as his cognitive function permits. Therapies following.  5. Malnutrition - 17 # weight loss since 8/18. No artificial feeding per discussion with family. Assist pt with ordering food, encourage PO intake of food and fluids in setting of dementia. Appreciate input from Ale granger   6. Spiritual Care- Oriented to Spiritual Health and Social Work Services as part of Palliative Care team.  is following. Daughters are unsure if  would be helpful intervention for pt.  7.  Care Planning- Lives in Memory Care. Is not able to return there, although his wife lives there. Daughters have been told by memory care staff to stay away due to pt's agitation when they visit, and are concerned about bringing pt's wife up to see him due to his agitation and aggressive behaviors. However, pt verbalizes feelings of abandonment by his family and his wife, and paranoid episodes where  Pt believes that his wife is having an affair. Appreciate JENNIFER Rapp assistance to coordinate and facilitate discharge planning. Pt's daughters have been recommended for alzheimer\dementia support group. Pt may benefit from neurology follow up after discharge for ongoing dementia and psychiatry  management.      Goal of Care:DNR/DNI. Selective interventions. No artificial feeding. POLST updated 10/15.     The chart will be reviewed regularly and the patient will be seen if necessary.   If you would like the patient to be seen, please contact the service at [phone] and ask to have the patient seen.  Time Spent  10 minutes, none in direct contact with patient or family.    Thank you!    Stephy [facility]   Pain Management and Palliative Care  [facility]  Pgr: [phone]
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
## 2018-10-17 NOTE — PLAN OF CARE
Problem: Restraint/Seclusion for Violent Self-Destructive Behavior  Goal: Prevent future episodes of restraint or seclusion  Identify nonphysical alternatives to restraint or seclusion.  Identify additional de-escalation supportive measures to use as alternatives to restraint or seclusion.   Outcome: No Change    VS: Temp: 97.9  F (36.6  C) Temp src: Axillary BP: 158/70   Heart Rate: 60 Resp: 28 SpO2: 94 % O2 Device: Simple face mask Oxygen Delivery: 2 LPM  Cardio: Bradycardic at times   Neuro: Disoriented x3, oriented to self, not redirectable, aggressive and combative at times; ROHIT numbness/tingling otherwise CMS intact   Resp: 2L via oxymask d/t shallow respirations, apneic at times, no SOB noted, placed on continuous monitoring  GI/: Incontinent of urine at times, frequently offering urinal, does void in urinal at times; ROHIT BS/LBM d/t aggression, unable to follow commands   Skin: Multiple bruises, BLE red/jessica; Skin tear on L hand resulted during Code 21 from watching pulling while restraints applied, pt was pull and resisting restraints during this episode  Activity: A1 w/ walker and gait belt   Diet: Mod CHO, refusing to eat this shift   IVs/lines: No access  Meds: One time dose of IM ativan 2mg given; PRN IM Haldol 2 mg given x1; Didn't give PM meds d/t lethargy   Misc: Code 21 called at 1635 (see provider notification note), 5 point restraints applied at this time, unable to educate pt or redirect pt; good circulation noted, no signs/symptoms of physical discomfort noted; removed chest restraint and BLE restraints at 2000, order placed to continue restraints; Watch taken off during Code 21 and placed in room w/ pt belongings   Labs: , 150  Plan: Discharge depending on placement, pallative/psych/WOC following; Continue plan of care

## 2018-10-18 LAB
ALBUMIN SERPL-MCNC: 2.7 G/DL (ref 3.4–5)
ALP SERPL-CCNC: 76 U/L (ref 40–150)
ALT SERPL W P-5'-P-CCNC: 13 U/L (ref 0–70)
ANION GAP SERPL CALCULATED.3IONS-SCNC: 9 MMOL/L (ref 3–14)
AST SERPL W P-5'-P-CCNC: 15 U/L (ref 0–45)
BILIRUB DIRECT SERPL-MCNC: 0.1 MG/DL (ref 0–0.2)
BILIRUB SERPL-MCNC: 0.3 MG/DL (ref 0.2–1.3)
BUN SERPL-MCNC: 24 MG/DL (ref 7–30)
CALCIUM SERPL-MCNC: 8.1 MG/DL (ref 8.5–10.1)
CHLORIDE SERPL-SCNC: 106 MMOL/L (ref 94–109)
CO2 SERPL-SCNC: 25 MMOL/L (ref 20–32)
CREAT SERPL-MCNC: 1.35 MG/DL (ref 0.66–1.25)
ERYTHROCYTE [DISTWIDTH] IN BLOOD BY AUTOMATED COUNT: 14.4 % (ref 10–15)
GFR SERPL CREATININE-BSD FRML MDRD: 50 ML/MIN/1.7M2
GLUCOSE BLDC GLUCOMTR-MCNC: 118 MG/DL (ref 70–99)
GLUCOSE BLDC GLUCOMTR-MCNC: 147 MG/DL (ref 70–99)
GLUCOSE BLDC GLUCOMTR-MCNC: 175 MG/DL (ref 70–99)
GLUCOSE BLDC GLUCOMTR-MCNC: 99 MG/DL (ref 70–99)
GLUCOSE SERPL-MCNC: 114 MG/DL (ref 70–99)
HCT VFR BLD AUTO: 36.3 % (ref 40–53)
HGB BLD-MCNC: 11.6 G/DL (ref 13.3–17.7)
INTERPRETATION ECG - MUSE: NORMAL
MCH RBC QN AUTO: 31 PG (ref 26.5–33)
MCHC RBC AUTO-ENTMCNC: 32 G/DL (ref 31.5–36.5)
MCV RBC AUTO: 97 FL (ref 78–100)
PLATELET # BLD AUTO: 280 10E9/L (ref 150–450)
POTASSIUM SERPL-SCNC: 4.1 MMOL/L (ref 3.4–5.3)
PROT SERPL-MCNC: 5.9 G/DL (ref 6.8–8.8)
RBC # BLD AUTO: 3.74 10E12/L (ref 4.4–5.9)
SODIUM SERPL-SCNC: 140 MMOL/L (ref 133–144)
WBC # BLD AUTO: 8.4 10E9/L (ref 4–11)

## 2018-10-18 PROCEDURE — 36415 COLL VENOUS BLD VENIPUNCTURE: CPT | Performed by: HOSPITALIST

## 2018-10-18 PROCEDURE — 80048 BASIC METABOLIC PNL TOTAL CA: CPT | Performed by: HOSPITALIST

## 2018-10-18 PROCEDURE — 40000275 ZZH STATISTIC RCP TIME EA 10 MIN

## 2018-10-18 PROCEDURE — 25000132 ZZH RX MED GY IP 250 OP 250 PS 637: Mod: GY | Performed by: INTERNAL MEDICINE

## 2018-10-18 PROCEDURE — 85027 COMPLETE CBC AUTOMATED: CPT | Performed by: HOSPITALIST

## 2018-10-18 PROCEDURE — A9270 NON-COVERED ITEM OR SERVICE: HCPCS | Mod: GY | Performed by: INTERNAL MEDICINE

## 2018-10-18 PROCEDURE — A9270 NON-COVERED ITEM OR SERVICE: HCPCS | Mod: GY | Performed by: HOSPITALIST

## 2018-10-18 PROCEDURE — 93005 ELECTROCARDIOGRAM TRACING: CPT

## 2018-10-18 PROCEDURE — 12000000 ZZH R&B MED SURG/OB

## 2018-10-18 PROCEDURE — 25000132 ZZH RX MED GY IP 250 OP 250 PS 637: Mod: GY | Performed by: HOSPITALIST

## 2018-10-18 PROCEDURE — 99232 SBSQ HOSP IP/OBS MODERATE 35: CPT | Performed by: CLINICAL NURSE SPECIALIST

## 2018-10-18 PROCEDURE — 99207 ZZC NON-BILLABLE SERV PER CHARTING: CPT | Performed by: PSYCHOLOGIST

## 2018-10-18 PROCEDURE — 80076 HEPATIC FUNCTION PANEL: CPT | Performed by: HOSPITALIST

## 2018-10-18 PROCEDURE — 00000146 ZZHCL STATISTIC GLUCOSE BY METER IP

## 2018-10-18 PROCEDURE — 93010 ELECTROCARDIOGRAM REPORT: CPT | Performed by: INTERNAL MEDICINE

## 2018-10-18 PROCEDURE — 99233 SBSQ HOSP IP/OBS HIGH 50: CPT | Performed by: INTERNAL MEDICINE

## 2018-10-18 PROCEDURE — G0463 HOSPITAL OUTPT CLINIC VISIT: HCPCS

## 2018-10-18 RX ADMIN — PRAVASTATIN SODIUM 40 MG: 20 TABLET ORAL at 08:27

## 2018-10-18 RX ADMIN — GABAPENTIN 200 MG: 100 CAPSULE ORAL at 16:37

## 2018-10-18 RX ADMIN — TICAGRELOR 90 MG: 90 TABLET ORAL at 08:27

## 2018-10-18 RX ADMIN — INSULIN ASPART 1 UNITS: 100 INJECTION, SOLUTION INTRAVENOUS; SUBCUTANEOUS at 12:54

## 2018-10-18 RX ADMIN — ASPIRIN 81 MG 81 MG: 81 TABLET ORAL at 08:28

## 2018-10-18 RX ADMIN — HALOPERIDOL 2 MG: 0.5 TABLET ORAL at 21:40

## 2018-10-18 RX ADMIN — HALOPERIDOL 2 MG: 0.5 TABLET ORAL at 08:28

## 2018-10-18 RX ADMIN — TIMOLOL MALEATE 1 DROP: 5 SOLUTION/ DROPS OPHTHALMIC at 21:40

## 2018-10-18 RX ADMIN — QUETIAPINE FUMARATE 25 MG: 25 TABLET ORAL at 08:28

## 2018-10-18 RX ADMIN — MIRTAZAPINE 15 MG: 15 TABLET, FILM COATED ORAL at 21:43

## 2018-10-18 RX ADMIN — ACETAMINOPHEN 650 MG: 325 TABLET, FILM COATED ORAL at 21:41

## 2018-10-18 RX ADMIN — GABAPENTIN 200 MG: 100 CAPSULE ORAL at 08:28

## 2018-10-18 RX ADMIN — VITAMIN D, TAB 1000IU (100/BT) 1000 UNITS: 25 TAB at 21:42

## 2018-10-18 RX ADMIN — DICLOFENAC SODIUM 2 G: 10 GEL TOPICAL at 17:46

## 2018-10-18 RX ADMIN — TICAGRELOR 90 MG: 90 TABLET ORAL at 21:41

## 2018-10-18 RX ADMIN — METOPROLOL SUCCINATE 100 MG: 100 TABLET, EXTENDED RELEASE ORAL at 08:26

## 2018-10-18 RX ADMIN — OLANZAPINE 2.5 MG: 2.5 TABLET, FILM COATED ORAL at 16:37

## 2018-10-18 RX ADMIN — DICLOFENAC SODIUM 2 G: 10 GEL TOPICAL at 21:40

## 2018-10-18 RX ADMIN — METFORMIN HYDROCHLORIDE 500 MG: 500 TABLET, FILM COATED ORAL at 17:46

## 2018-10-18 RX ADMIN — MELATONIN TAB 3 MG 5 MG: 3 TAB at 13:00

## 2018-10-18 RX ADMIN — SERTRALINE HYDROCHLORIDE 200 MG: 100 TABLET ORAL at 13:00

## 2018-10-18 RX ADMIN — OLANZAPINE 5 MG: 2.5 TABLET, FILM COATED ORAL at 21:41

## 2018-10-18 RX ADMIN — INSULIN ASPART 1 UNITS: 100 INJECTION, SOLUTION INTRAVENOUS; SUBCUTANEOUS at 19:34

## 2018-10-18 RX ADMIN — ACETAMINOPHEN 650 MG: 325 TABLET, FILM COATED ORAL at 16:37

## 2018-10-18 RX ADMIN — VITAMIN D, TAB 1000IU (100/BT) 1000 UNITS: 25 TAB at 08:28

## 2018-10-18 RX ADMIN — Medication 37.5 MG: at 19:33

## 2018-10-18 RX ADMIN — MELATONIN TAB 3 MG 5 MG: 3 TAB at 21:41

## 2018-10-18 RX ADMIN — GABAPENTIN 200 MG: 100 CAPSULE ORAL at 21:40

## 2018-10-18 ASSESSMENT — ACTIVITIES OF DAILY LIVING (ADL)
ADLS_ACUITY_SCORE: 16

## 2018-10-18 NOTE — PROGRESS NOTES
Focus: wound  D: Per MD note; 88 year old male who was admitted on 10/10/2018 with worsening dementia.  Past medical history significant for dementia, lives in a memory unit, history of CVA, arthritis, hypertension, depression.  Per family the patient has had dementia for awhile slowly worsening.  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  He then had a couple CVA's which worsened his memory.  He in addition had increased emotional lability and anger/aggression the past few months.    Right 2nd toe: 0.5 x 0.5 x 0.2cm drying wound base, no signs of infection and will plan to continue to use Betadine swab daily and cover with Bandaid to keep area clean and decrease the biofilm.     I: assessment of wound    A/P: debriding wound, slough and drainage was noted and now appears clean and dry. Will plan to continue Betadine to gently keep area clean and decrease biofilm and chance of infection.    Wound care to Right 2nd toe Daily  1. Wash wound with MicroKlenz spray, pat dry  2. Swab entire area and wound with Betadine, fan dry  3. Cover with Band aid to prevent injury.
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
## 2018-10-18 NOTE — PLAN OF CARE
Problem: Patient Care Overview  Goal: Plan of Care/Patient Progress Review  Outcome: Improving  A&O x 1, disoriented to place, time & situation, assist x 1 w/walker, carb count diet, , slept most of shift, no combative or aggressive behavior during shift, still waiting on placement, will continue with POC.
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
## 2018-10-18 NOTE — PLAN OF CARE
No bouts of agitation today.  Using call light for assist to bathroom.  Up with 2+walker as left leg tends to buckle or get weak.  Pt is aware of this.   Psych. and SW have spoken to  Two daughters on plan for either dementia [facility] unit to get best medicine control of behavior or if medicine control possible here then to another long term facility.  Current facility refuses to take him back, per SW

## 2018-10-18 NOTE — PROGRESS NOTES
D:  Per record review, the pt's discharge recommendation is to a LTC/MC facility or fanny psych.  Pt's discharge date is unknown at this time, pending placement.    I:  Manjit spoke with Kyleigh at [facility] ([phone]) regarding fanny psych placement.  Kyleigh said that they need the pt's  crisis notes, med clearance, facesheet, and a psych assessment completed stating that the pt needs an inpatient psych admission.  Kyleigh asked that there not be any other facilities looking at the pt while they are considering taking him.  If they decline the pt, manjit will explore other fanny psych facilities.    Manjit spoke with the physician who was going to put in a psych consult.  Psychology saw the pt and did an assessment to send to the facility.  Manjit contacted [facility] to let them know that the assessment was completed and they reported that they do not have any open beds, but to try back to see if anything has changed.  Manjit spoke with pt's dtrs Clara and Maribeth and gave them an update.  They are feeling uneasy about the pt's discharge placement at this time.  Manjit explained the discharge process and what the options are if he does not get into fanny psych.    A/P:  Manjit will continue with discharge planning and be available as needed.  Manjit will follow up with [facility] tomorrow to see if they have any openings.  Manjit will also follow up with other fanny psych programs if [facility] continues to be full and explore other discharge placement options.

## 2018-10-18 NOTE — PROGRESS NOTES
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 back from the bathroom when I arrived,   by 2 staff.  He made several sarcastic comments well-being   transferred.   He remembered me from previous contact.  As was   the case yesterday, he was significantly  more calm that he had   been in the past, but continues to be severely confused and still   becomes abruptly labile emotionally when allowed to persist on   his sense of abandonment..  He perseverates about why his wife   has not visited and quickly begins escalating while discussing   his sense of abandonment by her.  He also quickly escalates while   discussing his sense of abandonment by his children, even though   they had visited several times.    Risk update  He continues to not be a danger to himself, but staff are wary   about him and continue to be concerned about the risk of labile   mood and behavior.       Team Consultation  I conferred remotely with Dr. Roach.  I reviewed recent   medication administrations and concernabout prolonged QT.  She   recommended discontinuing Seroquel, which may be contributing to   the QT problem.  We also discussed whether we have done as much   as we can for him in this setting, andwhether  he may benefit   from transfer to geriatric psych.  Dr. Roach supported requesting    transfer to geriatric psych.     Diagnoses    unchanged     Recommendations   1.  Dr. Roach recommends that [place] social work staff contact   geriatric psych units to seek transfer due to patient's   persistent confusion, emotional lability, and risk of behavioral   lability and aggression.  Also, his prolonged QT development   complicates medication options.  2. The [facility] Psychiatric Consult   Team is available to follow-up, if needed; this will need to be   ordered.      Terry Bonilla Psy.D., L.P.  [phone]       Glucose by meter   Result Value Ref Range    Glucose 175 (H) 70 - 99 mg/dL

## 2018-10-19 LAB
ANION GAP SERPL CALCULATED.3IONS-SCNC: 7 MMOL/L (ref 3–14)
BUN SERPL-MCNC: 29 MG/DL (ref 7–30)
CALCIUM SERPL-MCNC: 8.6 MG/DL (ref 8.5–10.1)
CHLORIDE SERPL-SCNC: 108 MMOL/L (ref 94–109)
CO2 SERPL-SCNC: 25 MMOL/L (ref 20–32)
CREAT SERPL-MCNC: 1.42 MG/DL (ref 0.66–1.25)
GFR SERPL CREATININE-BSD FRML MDRD: 47 ML/MIN/1.7M2
GLUCOSE BLDC GLUCOMTR-MCNC: 132 MG/DL (ref 70–99)
GLUCOSE BLDC GLUCOMTR-MCNC: 133 MG/DL (ref 70–99)
GLUCOSE BLDC GLUCOMTR-MCNC: 185 MG/DL (ref 70–99)
GLUCOSE BLDC GLUCOMTR-MCNC: 96 MG/DL (ref 70–99)
GLUCOSE SERPL-MCNC: 161 MG/DL (ref 70–99)
POTASSIUM SERPL-SCNC: 3.9 MMOL/L (ref 3.4–5.3)
SODIUM SERPL-SCNC: 140 MMOL/L (ref 133–144)

## 2018-10-19 PROCEDURE — A9270 NON-COVERED ITEM OR SERVICE: HCPCS | Mod: GY | Performed by: INTERNAL MEDICINE

## 2018-10-19 PROCEDURE — 80048 BASIC METABOLIC PNL TOTAL CA: CPT | Performed by: INTERNAL MEDICINE

## 2018-10-19 PROCEDURE — 25000132 ZZH RX MED GY IP 250 OP 250 PS 637: Mod: GY | Performed by: HOSPITALIST

## 2018-10-19 PROCEDURE — A9270 NON-COVERED ITEM OR SERVICE: HCPCS | Mod: GY | Performed by: HOSPITALIST

## 2018-10-19 PROCEDURE — 12000000 ZZH R&B MED SURG/OB

## 2018-10-19 PROCEDURE — 25000132 ZZH RX MED GY IP 250 OP 250 PS 637: Mod: GY | Performed by: INTERNAL MEDICINE

## 2018-10-19 PROCEDURE — 36415 COLL VENOUS BLD VENIPUNCTURE: CPT | Performed by: INTERNAL MEDICINE

## 2018-10-19 PROCEDURE — 99232 SBSQ HOSP IP/OBS MODERATE 35: CPT | Performed by: INTERNAL MEDICINE

## 2018-10-19 PROCEDURE — 00000146 ZZHCL STATISTIC GLUCOSE BY METER IP

## 2018-10-19 RX ADMIN — ASPIRIN 81 MG 81 MG: 81 TABLET ORAL at 08:25

## 2018-10-19 RX ADMIN — DICLOFENAC SODIUM 2 G: 10 GEL TOPICAL at 17:23

## 2018-10-19 RX ADMIN — TICAGRELOR 90 MG: 90 TABLET ORAL at 08:26

## 2018-10-19 RX ADMIN — DICLOFENAC SODIUM 2 G: 10 GEL TOPICAL at 13:05

## 2018-10-19 RX ADMIN — GABAPENTIN 200 MG: 100 CAPSULE ORAL at 08:26

## 2018-10-19 RX ADMIN — OLANZAPINE 5 MG: 2.5 TABLET, FILM COATED ORAL at 23:45

## 2018-10-19 RX ADMIN — DICLOFENAC SODIUM 2 G: 10 GEL TOPICAL at 08:37

## 2018-10-19 RX ADMIN — VITAMIN D, TAB 1000IU (100/BT) 1000 UNITS: 25 TAB at 08:25

## 2018-10-19 RX ADMIN — MELATONIN TAB 3 MG 5 MG: 3 TAB at 23:36

## 2018-10-19 RX ADMIN — POLYETHYLENE GLYCOL 3350 17 G: 17 POWDER, FOR SOLUTION ORAL at 16:00

## 2018-10-19 RX ADMIN — MELATONIN TAB 3 MG 5 MG: 3 TAB at 13:04

## 2018-10-19 RX ADMIN — INSULIN ASPART 1 UNITS: 100 INJECTION, SOLUTION INTRAVENOUS; SUBCUTANEOUS at 08:39

## 2018-10-19 RX ADMIN — METFORMIN HYDROCHLORIDE 500 MG: 500 TABLET, FILM COATED ORAL at 17:02

## 2018-10-19 RX ADMIN — QUETIAPINE FUMARATE 25 MG: 25 TABLET ORAL at 08:26

## 2018-10-19 RX ADMIN — MIRTAZAPINE 15 MG: 15 TABLET, FILM COATED ORAL at 23:37

## 2018-10-19 RX ADMIN — HALOPERIDOL 2 MG: 0.5 TABLET ORAL at 23:37

## 2018-10-19 RX ADMIN — METOPROLOL SUCCINATE 100 MG: 100 TABLET, EXTENDED RELEASE ORAL at 08:26

## 2018-10-19 RX ADMIN — GABAPENTIN 200 MG: 100 CAPSULE ORAL at 23:37

## 2018-10-19 RX ADMIN — PRAVASTATIN SODIUM 40 MG: 20 TABLET ORAL at 08:27

## 2018-10-19 RX ADMIN — TIMOLOL MALEATE 1 DROP: 5 SOLUTION/ DROPS OPHTHALMIC at 08:37

## 2018-10-19 RX ADMIN — HALOPERIDOL 2 MG: 0.5 TABLET ORAL at 08:27

## 2018-10-19 RX ADMIN — OLANZAPINE 2.5 MG: 2.5 TABLET, FILM COATED ORAL at 04:13

## 2018-10-19 RX ADMIN — METFORMIN HYDROCHLORIDE 500 MG: 500 TABLET, FILM COATED ORAL at 08:27

## 2018-10-19 RX ADMIN — GABAPENTIN 200 MG: 100 CAPSULE ORAL at 15:59

## 2018-10-19 RX ADMIN — INSULIN ASPART 1 UNITS: 100 INJECTION, SOLUTION INTRAVENOUS; SUBCUTANEOUS at 17:00

## 2018-10-19 RX ADMIN — SERTRALINE HYDROCHLORIDE 200 MG: 100 TABLET ORAL at 13:04

## 2018-10-19 ASSESSMENT — ACTIVITIES OF DAILY LIVING (ADL)
ADLS_ACUITY_SCORE: 25
ADLS_ACUITY_SCORE: 21
ADLS_ACUITY_SCORE: 25

## 2018-10-19 NOTE — PROGRESS NOTES
CLINICAL NUTRITION SERVICES - BRIEF NOTE    - Refer to RD assessment completed yesterday.  - Received request from RN to order Boost supplement per family.  - Family in room reporting poor oral intakes for some period of time PTA.  Vague regarding specifics but do note patient is a picky eater.  Likes meats, fruits/vegetables.    - Want Boost scheduled BID between meals (chocolate or vanilla over ice).   - Per review of chart, Palliative following, oral intake push only.  - Will change to high CHO diet given assessed needs and oral intake/BG trending.  - Will continue following.      Stephy Leone, WILTON, LD  Clinical Dietitian  3rd floor/ICU: [phone]  All other floors: [phone]  Weekend/holiday: [phone]

## 2018-10-19 NOTE — PLAN OF CARE
Appetite  poor.  Did drink some ensure and dietary will send up in between meals.  Confused but calm  Able to direct.  Weak,  Up with 2 to bathroom.   SW talked again with family on POC.  See her notes

## 2018-10-19 NOTE — PROGRESS NOTES
D:  Per record review, pt's discharge recommendation is to LTC/MC after a potential admit to [facility].    I: Jermaine spoke with staff at [facility] ([phone]) who said that they may have an opening for the pt and to fax over his paperwork ([phone]).  Jermaine faxed pt's facesheet, psych notes, and nursing notes to the facility.  Jermaine received a call back asking for documentation for a POA and regarding who the decision maker is for the pt.  Jermaine faxed over documentation showing that Clara is the decision maker.  They said if everything clears, they can potentially accept the pt tomorrow.    Jermaine spoke with pt's dtr Clara ([phone]) who said that they toured [facility] in [place] for potential placement for the pt.  Clara said that the facility would be contacting sw regarding pt's records.    Jermaine spoke with Mayelin at [facility] ([phone]) who said that the family has put money down to save a room for the pt as they really want the pt to go to the facility.  Mayelin asked if the pt was going to [facility] based on what the pt's family said.  Jermaine stated that the process is taking place to get the pt into the [facility] unit in [place].  Mayelin said that they would possibly take the pt without him going to [facility] psych if they have to, but would prefer not to.  Mayelin said that she can come [facility] first thing Monday morning to do an assessment of the pt to see if he is appropriate for their facility.      Addendum:    Jermaine spoke with staff at [facility] who will accept the pt tomorrow after 10:00am.  They asked that they be called when transportation is setup and that the orders are faxed to them.  Jermaine called Mayelin and [place] to update her.  She asked that the discharge paperwork be faxed to her so she has it for when they admit the pt to their facility after [facility] psych stay.  Jermaine called and left a voicemail for Clara to update her on the discharge plans as well.    **Jermaine  will need to set up transport for the pt to the facility.
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
## 2018-10-19 NOTE — PLAN OF CARE
Problem: Patient Care Overview  Goal: Plan of Care/Patient Progress Review  Outcome: Improving  A&O x 1, disoriented to place, time & situation, assist x 2 w/gait belt, marcelo steady used, carb count diet, BS 96, zyprexa given once during shift, waiting for placement to possibly geriatric psych, will continue to monitor.

## 2018-10-19 NOTE — PLAN OF CARE
Problem: Fall Risk (Adult)  Goal: Identify Related Risk Factors and Signs and Symptoms  Related risk factors and signs and symptoms are identified upon initiation of Human Response Clinical Practice Guideline (CPG).   PRIMARY DIAGNOSIS: Agitation, Aggression  GOALS TO BE MET BEFORE DISCHARGE:  1. ADLs back to baseline: No. Poor unsteady gait, patient's knees tend to 'Buckle' while ambulating.    2. Activity and level of assistance: Up with x2 assistance using roller walker & gait belt. May need to consider using Jolie Steady for safety d/t knees buckling while walking.    3. Pain status: Improved-controlled with oral pain medications.    4. Return to near baseline physical activity: No. Patient continues to have unsteady gait while ambulating, his knees tend to 'give out' while walking.     Discharge Planner Nurse   Safe discharge environment identified: No. Possible discharge to an Assistive Living facility d/t patient being a total care with Unsteady/poor gait.  Barriers to discharge: Yes. Patient weakness in bilateral lower extremities.   Patient is alert to self, and a Hx of dementia with noted forgetfulness. He has not presented any aggressive tendencies this shift. (+)CSM BUE/BLE. No respiratory or cardiac distress noted this shift. Patient is incontinent of bowel & bladder.

## 2018-10-20 LAB
ANION GAP SERPL CALCULATED.3IONS-SCNC: 8 MMOL/L (ref 3–14)
BUN SERPL-MCNC: 26 MG/DL (ref 7–30)
CALCIUM SERPL-MCNC: 8.8 MG/DL (ref 8.5–10.1)
CHLORIDE SERPL-SCNC: 107 MMOL/L (ref 94–109)
CO2 SERPL-SCNC: 24 MMOL/L (ref 20–32)
CREAT SERPL-MCNC: 1.4 MG/DL (ref 0.66–1.25)
GFR SERPL CREATININE-BSD FRML MDRD: 48 ML/MIN/1.7M2
GLUCOSE BLDC GLUCOMTR-MCNC: 112 MG/DL (ref 70–99)
GLUCOSE BLDC GLUCOMTR-MCNC: 170 MG/DL (ref 70–99)
GLUCOSE BLDC GLUCOMTR-MCNC: 195 MG/DL (ref 70–99)
GLUCOSE BLDC GLUCOMTR-MCNC: 229 MG/DL (ref 70–99)
GLUCOSE SERPL-MCNC: 143 MG/DL (ref 70–99)
POTASSIUM SERPL-SCNC: 4.2 MMOL/L (ref 3.4–5.3)
SODIUM SERPL-SCNC: 139 MMOL/L (ref 133–144)

## 2018-10-20 PROCEDURE — 80048 BASIC METABOLIC PNL TOTAL CA: CPT | Performed by: INTERNAL MEDICINE

## 2018-10-20 PROCEDURE — A9270 NON-COVERED ITEM OR SERVICE: HCPCS | Mod: GY | Performed by: INTERNAL MEDICINE

## 2018-10-20 PROCEDURE — 25000132 ZZH RX MED GY IP 250 OP 250 PS 637: Mod: GY | Performed by: INTERNAL MEDICINE

## 2018-10-20 PROCEDURE — 12000000 ZZH R&B MED SURG/OB

## 2018-10-20 PROCEDURE — A9270 NON-COVERED ITEM OR SERVICE: HCPCS | Mod: GY | Performed by: HOSPITALIST

## 2018-10-20 PROCEDURE — 36415 COLL VENOUS BLD VENIPUNCTURE: CPT | Performed by: INTERNAL MEDICINE

## 2018-10-20 PROCEDURE — 25000132 ZZH RX MED GY IP 250 OP 250 PS 637: Mod: GY | Performed by: HOSPITALIST

## 2018-10-20 PROCEDURE — 99207 ZZC CDG-MDM COMPONENT: MEETS LOW - DOWN CODED: CPT | Performed by: INTERNAL MEDICINE

## 2018-10-20 PROCEDURE — 99232 SBSQ HOSP IP/OBS MODERATE 35: CPT | Performed by: INTERNAL MEDICINE

## 2018-10-20 PROCEDURE — 25000128 H RX IP 250 OP 636: Performed by: INTERNAL MEDICINE

## 2018-10-20 PROCEDURE — 00000146 ZZHCL STATISTIC GLUCOSE BY METER IP

## 2018-10-20 RX ORDER — HALOPERIDOL 2 MG/1
2 TABLET ORAL 2 TIMES DAILY
DISCHARGE
Start: 2018-10-20

## 2018-10-20 RX ORDER — SODIUM CHLORIDE 9 MG/ML
INJECTION, SOLUTION INTRAVENOUS CONTINUOUS
Status: DISCONTINUED | OUTPATIENT
Start: 2018-10-20 | End: 2018-10-21 | Stop reason: HOSPADM

## 2018-10-20 RX ORDER — METOPROLOL SUCCINATE 25 MG/1
75 TABLET, EXTENDED RELEASE ORAL 2 TIMES DAILY
Qty: 62 TABLET | Refills: 11 | DISCHARGE
Start: 2018-10-20

## 2018-10-20 RX ORDER — OLANZAPINE 5 MG/1
5 TABLET ORAL AT BEDTIME
Qty: 30 TABLET | Refills: 0 | DISCHARGE
Start: 2018-10-20

## 2018-10-20 RX ORDER — MIRTAZAPINE 15 MG/1
15 TABLET, FILM COATED ORAL AT BEDTIME
Qty: 30 TABLET | Refills: 0 | DISCHARGE
Start: 2018-10-20

## 2018-10-20 RX ORDER — OLANZAPINE 2.5 MG/1
2.5 TABLET, FILM COATED ORAL EVERY 8 HOURS PRN
Qty: 60 TABLET | DISCHARGE
Start: 2018-10-20

## 2018-10-20 RX ORDER — GABAPENTIN 100 MG/1
200 CAPSULE ORAL 3 TIMES DAILY
Qty: 90 CAPSULE | DISCHARGE
Start: 2018-10-20

## 2018-10-20 RX ADMIN — VITAMIN D, TAB 1000IU (100/BT) 1000 UNITS: 25 TAB at 20:19

## 2018-10-20 RX ADMIN — SODIUM CHLORIDE: 9 INJECTION, SOLUTION INTRAVENOUS at 22:47

## 2018-10-20 RX ADMIN — METOPROLOL SUCCINATE 75 MG: 25 TABLET, EXTENDED RELEASE ORAL at 20:19

## 2018-10-20 RX ADMIN — INSULIN ASPART 2 UNITS: 100 INJECTION, SOLUTION INTRAVENOUS; SUBCUTANEOUS at 17:05

## 2018-10-20 RX ADMIN — TICAGRELOR 90 MG: 90 TABLET ORAL at 08:43

## 2018-10-20 RX ADMIN — TICAGRELOR 90 MG: 90 TABLET ORAL at 20:19

## 2018-10-20 RX ADMIN — PRAVASTATIN SODIUM 40 MG: 20 TABLET ORAL at 08:44

## 2018-10-20 RX ADMIN — VITAMIN D, TAB 1000IU (100/BT) 1000 UNITS: 25 TAB at 08:44

## 2018-10-20 RX ADMIN — MIRTAZAPINE 15 MG: 15 TABLET, FILM COATED ORAL at 20:19

## 2018-10-20 RX ADMIN — GABAPENTIN 200 MG: 100 CAPSULE ORAL at 20:18

## 2018-10-20 RX ADMIN — GABAPENTIN 200 MG: 100 CAPSULE ORAL at 08:44

## 2018-10-20 RX ADMIN — MELATONIN TAB 3 MG 5 MG: 3 TAB at 13:45

## 2018-10-20 RX ADMIN — ASPIRIN 81 MG 81 MG: 81 TABLET ORAL at 08:44

## 2018-10-20 RX ADMIN — SERTRALINE HYDROCHLORIDE 200 MG: 100 TABLET ORAL at 13:45

## 2018-10-20 RX ADMIN — TIMOLOL MALEATE 1 DROP: 5 SOLUTION/ DROPS OPHTHALMIC at 08:52

## 2018-10-20 RX ADMIN — HALOPERIDOL 2 MG: 0.5 TABLET ORAL at 08:43

## 2018-10-20 RX ADMIN — HALOPERIDOL 2 MG: 0.5 TABLET ORAL at 20:18

## 2018-10-20 RX ADMIN — METFORMIN HYDROCHLORIDE 500 MG: 500 TABLET, FILM COATED ORAL at 08:44

## 2018-10-20 RX ADMIN — MELATONIN TAB 3 MG 5 MG: 3 TAB at 20:19

## 2018-10-20 RX ADMIN — OLANZAPINE 5 MG: 2.5 TABLET, FILM COATED ORAL at 20:18

## 2018-10-20 RX ADMIN — SODIUM CHLORIDE: 9 INJECTION, SOLUTION INTRAVENOUS at 10:06

## 2018-10-20 RX ADMIN — GABAPENTIN 200 MG: 100 CAPSULE ORAL at 17:05

## 2018-10-20 RX ADMIN — METFORMIN HYDROCHLORIDE 500 MG: 500 TABLET, FILM COATED ORAL at 17:04

## 2018-10-20 ASSESSMENT — ACTIVITIES OF DAILY LIVING (ADL)
ADLS_ACUITY_SCORE: 25

## 2018-10-20 NOTE — PROGRESS NOTES
Will not see today:  PALLIATIVE CARE SERVICE CHART CHECK  This patient's chart has been reviewed by me. It has been determined that no change is necessary to the palliative care plan at this time. The patient's chart will be reviewed periodically and the patient will be seen if necessary. If you would like the patient to be seen, please contact the service at [phone] and ask to have the patient seen.    POL 10/15/18 is in pt paper record. Appreciate nursing to include with pt's discharge packet to [facility] on 10/20/18.    Thank you for the opportunity to assist in the care of this patient.    LACY Lopez,  AGCNS-BC, ACHPN   Clinical Nurse Specialist  Pain and Palliative Program  [facility]  Pager: [phone]  Office: [phone]

## 2018-10-20 NOTE — PLAN OF CARE
Problem: Patient Care Overview  Goal: Plan of Care/Patient Progress Review  Outcome: No Change  A&O x 1, disoriented to time, place & situation, assist x 1 w/ marcelo steady, carb count diet, , evening meds not given but pt willing to take meds for me this shift so some evening meds were given, 2 BM's during shift after several attempts, some agitation and frustration towards staff, possible discharge today to [facility], will continue to monitor.
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
## 2018-10-20 NOTE — PROGRESS NOTES
MARICARMEN confirmed bed for pt at [facility] ([phone]).  Per MD pt not ready for discharge until Sunday.  [facility] will hold bed.      MARICARMEN to arrange transportation.  Address of [facility] is [address] [place] [address].

## 2018-10-20 NOTE — PLAN OF CARE
Problem: Patient Care Overview  Goal: Plan of Care/Patient Progress Review  Outcome: No Change  Alert to self only, confused, impulsive, up to toilet with A-2 with marcelo shetty, ate well at supper, , 132, Psych following, refuse all his hs medication despite repeated attempts, patient very adamant telling staff to get out of the room,  plan to discharge to [facility] tomorrow, will continue with POC.

## 2018-10-20 NOTE — PLAN OF CARE
"Problem: Patient Care Overview  Goal: Plan of Care/Patient Progress Review  Outcome: No Change  Oriented to self only. BP soft otherwise VSS. IVF @100mL/hr d/t elevated creat. Pt calm and more cooperative this shift, asking for whereabouts of wife and states \"you are all liars\", verbally abusive to staff calling staff names. Such as \"stupid\", \"dumb bells\". Refused insulin and pain cream. Takes pills whole in applesauce. Denies pain and SOB. SeraSteady for transfers or Assist x2 with walker. Pt up in chair this shift, declines change of position. Mod Carb diet, tolerating well. Plan to discharge to [facility] tomorrow. Continue POC for now.       "

## 2018-10-21 VITALS
DIASTOLIC BLOOD PRESSURE: 64 MMHG | HEART RATE: 55 BPM | WEIGHT: 195.25 LBS | OXYGEN SATURATION: 94 % | TEMPERATURE: 96.3 F | BODY MASS INDEX: 28.83 KG/M2 | SYSTOLIC BLOOD PRESSURE: 155 MMHG | RESPIRATION RATE: 20 BRPM

## 2018-10-21 LAB
ANION GAP SERPL CALCULATED.3IONS-SCNC: 4 MMOL/L (ref 3–14)
BUN SERPL-MCNC: 25 MG/DL (ref 7–30)
CALCIUM SERPL-MCNC: 8.2 MG/DL (ref 8.5–10.1)
CHLORIDE SERPL-SCNC: 111 MMOL/L (ref 94–109)
CO2 SERPL-SCNC: 27 MMOL/L (ref 20–32)
CREAT SERPL-MCNC: 1.36 MG/DL (ref 0.66–1.25)
GFR SERPL CREATININE-BSD FRML MDRD: 49 ML/MIN/1.7M2
GLUCOSE BLDC GLUCOMTR-MCNC: 103 MG/DL (ref 70–99)
GLUCOSE BLDC GLUCOMTR-MCNC: 118 MG/DL (ref 70–99)
GLUCOSE SERPL-MCNC: 112 MG/DL (ref 70–99)
POTASSIUM SERPL-SCNC: 4.3 MMOL/L (ref 3.4–5.3)
SODIUM SERPL-SCNC: 142 MMOL/L (ref 133–144)

## 2018-10-21 PROCEDURE — A9270 NON-COVERED ITEM OR SERVICE: HCPCS | Mod: GY | Performed by: HOSPITALIST

## 2018-10-21 PROCEDURE — 99239 HOSP IP/OBS DSCHRG MGMT >30: CPT | Performed by: INTERNAL MEDICINE

## 2018-10-21 PROCEDURE — 25000132 ZZH RX MED GY IP 250 OP 250 PS 637: Mod: GY | Performed by: INTERNAL MEDICINE

## 2018-10-21 PROCEDURE — 00000146 ZZHCL STATISTIC GLUCOSE BY METER IP

## 2018-10-21 PROCEDURE — 36415 COLL VENOUS BLD VENIPUNCTURE: CPT | Performed by: INTERNAL MEDICINE

## 2018-10-21 PROCEDURE — 80048 BASIC METABOLIC PNL TOTAL CA: CPT | Performed by: INTERNAL MEDICINE

## 2018-10-21 PROCEDURE — A9270 NON-COVERED ITEM OR SERVICE: HCPCS | Mod: GY | Performed by: INTERNAL MEDICINE

## 2018-10-21 PROCEDURE — 25000132 ZZH RX MED GY IP 250 OP 250 PS 637: Mod: GY | Performed by: HOSPITALIST

## 2018-10-21 RX ADMIN — TICAGRELOR 90 MG: 90 TABLET ORAL at 09:55

## 2018-10-21 RX ADMIN — METOPROLOL SUCCINATE 75 MG: 25 TABLET, EXTENDED RELEASE ORAL at 09:54

## 2018-10-21 RX ADMIN — PRAVASTATIN SODIUM 40 MG: 20 TABLET ORAL at 09:55

## 2018-10-21 RX ADMIN — METFORMIN HYDROCHLORIDE 500 MG: 500 TABLET, FILM COATED ORAL at 09:55

## 2018-10-21 RX ADMIN — HALOPERIDOL 2 MG: 0.5 TABLET ORAL at 09:54

## 2018-10-21 RX ADMIN — DICLOFENAC SODIUM 2 G: 10 GEL TOPICAL at 09:56

## 2018-10-21 RX ADMIN — VITAMIN D, TAB 1000IU (100/BT) 1000 UNITS: 25 TAB at 09:55

## 2018-10-21 RX ADMIN — TIMOLOL MALEATE 1 DROP: 5 SOLUTION/ DROPS OPHTHALMIC at 09:56

## 2018-10-21 RX ADMIN — GABAPENTIN 200 MG: 100 CAPSULE ORAL at 09:54

## 2018-10-21 RX ADMIN — ASPIRIN 81 MG 81 MG: 81 TABLET ORAL at 09:55

## 2018-10-21 ASSESSMENT — ACTIVITIES OF DAILY LIVING (ADL)
ADLS_ACUITY_SCORE: 25

## 2018-10-21 NOTE — DISCHARGE SUMMARY
[facility]    Discharge Summary  Hospitalist    Date of Admission:  10/10/2018  Date of Discharge:  10/21/2018 11:44 AM  Discharging Provider: Tacho Henley MD  Date of Service (when I saw the patient): 10/21/18    Discharge Diagnoses       1. Advanced dementia, multiple CVA, aggressive behavior: now improved with medication changes      2. FERMIN likely due to decreased intake      3. Depression      4. Prior multiple CVA      5. 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 Hypertension      7. Non-severe malnutrition      8. Fall      9. Diabetes Mellitus Type II     10. Mild increase in creatinine    History of Present Illness   Cecil Martinez is an 88 year old male who presented with     Hospital Course   Cecil Martinez is a 88 year old male who was admitted on 10/10/2018 with agitation/aggression towards his wife and staff at the memory care unit.  Past medical history significant for dementia, lives in a memory unit, history of CVA, arthritis, hypertension, depression. Per family the patient has had dementia for awhile slowly worsening.  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 He then had a couple CVA's which worsened his memory.  He in addition had increased emotional lability and anger/aggression the past few months. Psych consulted and has been making adjustments to his medications for behavioral control.       1. Advanced dementia, multiple CVA, aggressive behavior: now improved with medication changes   No acute infection/other clear trigger. In talking to family he appears to have had more emotional lability since his last CVA. There are no overt new focal neuro changes. Previous rounder discussed MRI with family, but would be very difficult to obtain increasing agitation and they would like to avoid major interventions at this point.    --Behavior appears stable currently  --Current meds: Haldol 2mg BID, Gabapentin 200mg TID, Zoloft 200mg daily,  Remeron 15mg at bedtime, Olanzapine 5mg at bedtime, Zyprexa 2.5 mg 3 times daily as needed for agitation.   --Seroquel discontinued due to prolonged QTc per psych recs  --EKG on 10/15 with [id number], 10/17- QTc 505,  10/18-QTc 482     2. FERMIN likely due to decreased intake. Will start on IV fluid.       3. Depression:  --Longstanding issue, on 200 mg zoloft. Continue current dose for now.      4. Prior multiple CVA:  --Statin  --ASA, brilinta      5. 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  His family has been challenged as to how aggressive to be with this. As it has not been worsening they have wanted to avoid surgery.  --Wound care RN evaluated  --Dressing changes per nursing staff.      6. Hypertension:  -- Not well controlled, noted he was only on a partial dose of his home metoprolol. This has been increased it back to home dose.      7. Non-severe malnutrition:  --dietitian following      8. Fall:   -- Fall leading into admission. No major injuries noted but has some ecchymosis. It sounds like the fall happened when he was being aggressive. Monitor for now.       9. Diabetes Mellitus Type II:  --Glucose reasonably controlled  --Continued  with PTA Metformin, he was given sliding scale.      10. Mild increase in creat  Difficulty to know baseline renal function. Last cr in epic from 7/2018 was 3.4. No care-everywhere record available. Creatinine this admission is 1.17.    He was given IV fluids.       Significant Results and Procedures   Results for orders placed or performed during the hospital encounter of 10/10/18   XR Sacrum and Coccyx 2 Views    Narrative    XR SACRUM AND COCCYX TWO VIEWS   10/10/2018 5:53 PM     HISTORY: Ecchymosis, fall, tenderness.    COMPARISON: None.      Impression    IMPRESSION: Negative for fracture. Degenerative changes are seen in  the spine.    CRISTOPHER PRUITT MD   CT Head w/o Contrast    Narrative    CT SCAN OF THE HEAD  WITHOUT CONTRAST   10/10/2018 5:41 PM     HISTORY: Trauma, evaluate fracture, bleed.     TECHNIQUE: Axial images of the head and coronal reformations without  IV contrast material. Radiation dose for this scan was reduced using  automated exposure control, adjustment of the mA and/or kV according  to patient size, or iterative reconstruction technique.    COMPARISON: None.    FINDINGS: Moderate to severe volume loss is present. Extensive white  matter hypoattenuation likely represents chronic small vessel ischemic  change, advanced for age. Multiple bilateral basal ganglia  subcentimeter areas of hypoattenuation likely represent old lacunar  infarcts. No evidence of acute ischemia, hemorrhage, mass, mass effect  or hydrocephalus. The visualized calvarium, skull base, paranasal  sinuses, and extra cranial soft tissues are unremarkable. Right  orbital foreign body/prosthesis is noted. Bilateral lens replacements  are present.      Impression    IMPRESSION: No acute intracranial abnormality.    TILA COOPER MD         Pending Results   None    Code Status   Full Code       Primary Care Physician   Leilani Aguila        Discharge Disposition   Discharged to home  Condition at discharge: Stable    Consultations This Hospital Stay   SOCIAL WORK IP CONSULT  PHYSICAL THERAPY ADULT IP CONSULT  SOCIAL WORK IP CONSULT  PSYCHIATRY IP CONSULT  WOUND OSTOMY CONTINENCE NURSE  IP CONSULT  WOUND OSTOMY CONTINENCE NURSE  IP CONSULT  PALLIATIVE CARE ADULT IP CONSULT  PHYSICAL THERAPY ADULT IP CONSULT  PSYCHIATRY IP CONSULT  PSYCHIATRY IP CONSULT  PSYCHIATRY IP CONSULT    Time Spent on this Encounter   I, Tacho Henley MD, personally saw the patient today and spent greater than 30 minutes discharging this patient.    Discharge Orders     General info for SNF   Length of Stay Estimate: Short Term Care: Estimated # of Days <30  Condition at Discharge: Stable  Level of care:skilled   Rehabilitation Potential:  Fair  Admission H&P remains valid and up-to-date: Yes  Recent Chemotherapy: N/A  Use Nursing Home Standing Orders: Yes     Mantoux instructions   Give two-step Mantoux (PPD) Per Facility Policy Yes     Reason for your hospital stay   Agitation     Activity - Up ad cristopher     Follow Up and recommended labs and tests   Follow up with Nursing home physician.     DNR/DNI     Advance Diet as Tolerated   Follow this diet upon discharge: Orders Placed This Encounter     Snacks/Supplements Adult: Boost Plus; Between Meals     High Consistent CHO Diet       Discharge Medications   Discharge Medication List as of 10/21/2018 11:12 AM      START taking these medications    Details   haloperidol (HALDOL) 2 MG tablet Take 1 tablet (2 mg) by mouth 2 times daily, Transitional      mirtazapine (REMERON) 15 MG tablet Take 1 tablet (15 mg) by mouth At Bedtime, Disp-30 tablet, R-0, Transitional      !! OLANZapine (ZYPREXA) 2.5 MG tablet Take 1 tablet (2.5 mg) by mouth every 8 hours as needed (agitation), Disp-60 tablet, Transitional      !! OLANZapine (ZYPREXA) 5 MG tablet Take 1 tablet (5 mg) by mouth At Bedtime, Disp-30 tablet, R-0, Transitional       !! - Potential duplicate medications found. Please discuss with provider.      CONTINUE these medications which have CHANGED    Details   gabapentin (NEURONTIN) 100 MG capsule Take 2 capsules (200 mg) by mouth 3 times daily, Disp-90 capsule, Transitional      metoprolol succinate (TOPROL-XL) 25 MG 24 hr tablet Take 3 tablets (75 mg) by mouth 2 times daily, Disp-62 tablet, R-11, Transitional         CONTINUE these medications which have NOT CHANGED    Details   allopurinol (ZYLOPRIM) 100 MG tablet Take 1 tablet (100 mg) by mouth daily, Disp-31 tablet, R-11, E-Prescribe      aspirin 81 MG tablet Take 1 tablet (81 mg) by mouth daily, Disp-90 tablet, R-3, E-Prescribe      cholecalciferol (VITAMIN D3) 1000 UNIT tablet Take 1 tablet (1,000 Units) by mouth 2 times daily, Disp-30 tablet, R-11,  E-Prescribe      coenzyme Q-10 200 MG CAPS Take 200 mg by mouth daily, Disp-31 capsule, R-11, E-Prescribe      diclofenac (VOLTAREN) 1 % GEL topical gel Place onto the skin 4 times daily Apply to right wrist small layer TDHistorical      EMOLLIENT EX Externally apply topically 2 times daily To dry patch right hand index finger, Historical      melatonin 5 MG tablet Take 5 mg by mouth 2 times daily at 1pm and bedtime, Historical      metFORMIN (GLUCOPHAGE) 500 MG tablet Take 1 tablet (500 mg) by mouth 2 times daily (with meals), Disp-62 tablet, R-11, E-Prescribe      !! multivitamin (OCUVITE) TABS tablet Take 1 tablet by mouth 2 times daily, Disp-31 each, R-11, E-Prescribe      !! multivitamin, therapeutic with minerals (THERA-VIT-M) TABS tablet Take 1 tablet by mouth daily, Disp-31 each, R-11, E-Prescribe      Omega-3 Fatty Acids (FISH OIL) 1200 MG capsule Take 1 capsule (1,200 mg) by mouth daily, Disp-31 capsule, R-11, E-Prescribe      pravastatin (PRAVACHOL) 10 MG tablet Take 4 tablets (40 mg) by mouth daily, Disp-31 tablet, R-11, E-Prescribe      rosiglitazone (AVANDIA) 4 MG tablet Take 1 tablet (4 mg) by mouth daily, Disp-31 tablet, R-11, E-Prescribe      sertraline (ZOLOFT) 25 MG tablet Take 8 tablets (200 mg) by mouth daily, Disp-31 tablet, R-11, E-Prescribe      ticagrelor (BRILINTA) 90 MG tablet Take 1 tablet (90 mg) by mouth 2 times daily, Disp-62 tablet, R-11, E-Prescribe      timolol (TIMOPTIC) 0.5 % ophthalmic solution INSTILL ONE DROP TO EYE(S) TWICE DAILY, Disp-5 mL, R-97, E-PrescribeFOR AN ASSISTED LIVING PATIENT AT [facility]      Acetaminophen (MAPAP PO) Take 650 mg by mouth every 4 hours as needed for mild pain or fever, Historical      ARTIFICIAL TEAR OP Apply 1 drop to eye 4 times daily as needed To both eyes qid prn for dry/itchy eyes., Historical      order for DME Equipment being ordered: Glucerna/Protein Supplement  Take 1-3 caner per day PRNDisp-100 each, R-11, Local Print      polyethylene  glycol 3350 POWD Take 17 g by mouth daily as needed , Historical       !! - Potential duplicate medications found. Please discuss with provider.        # Pain Assessment:  Current Pain Score 10/20/2018   Patient currently in pain? denies   Pain score (0-10) -   Pain location -   Pain descriptors -   Cecil adams pain level was assessed and he currently denies pain.      Allergies   Allergies   Allergen Reactions     Hmg-Coa-R Inhibitors      Severe, myalgias 7/17/18.     Sitagliptin      Severe , Hives 7/17/18.     Data   Most Recent 3 CBC's:  Recent Labs   Lab Test  10/18/18   0624  10/17/18   0653  10/16/18   0743   WBC  8.4  6.5  8.3   HGB  11.6*  12.4*  11.9*   MCV  97  97  97   PLT  280  276  276      Most Recent 3 BMP's:  Recent Labs   Lab Test  10/21/18   0705  10/20/18   0715  10/19/18   0638   NA  142  139  140   POTASSIUM  4.3  4.2  3.9   CHLORIDE  111*  107  108   CO2  27  24  25   BUN  25  26  29   CR  1.36*  1.40*  1.42*   ANIONGAP  4  8  7   GRISEL  8.2*  8.8  8.6   GLC  112*  143*  161*     Most Recent 2 LFT's:  Recent Labs   Lab Test  10/18/18   0624 08/29/18   AST  15  12   ALT  13  <9   ALKPHOS  76  70*   BILITOTAL  0.3  0.4     Most Recent INR's and Anticoagulation Dosing History:  Anticoagulation Dose History     Recent Dosing and Labs Latest Ref Rng & Units 10/10/2018    INR 0.86 - 1.14 1.02        Most Recent 3 Troponin's:No lab results found.  Most Recent Cholesterol Panel:  Recent Labs   Lab Test 08/29/18   CHOL  123   LDL  55   HDL  32*   TRIG  181*     Most Recent 6 Bacteria Isolates From Any Culture (See EPIC Reports for Culture Details):No lab results found.  Most Recent TSH, T4 and A1c Labs:  Recent Labs   Lab Test  10/10/18   1410 08/29/18   TSH   --   1.37   A1C  7.7*  7.4*

## 2018-10-21 NOTE — PLAN OF CARE
Problem: Patient Care Overview  Goal: Plan of Care/Patient Progress Review  Outcome: No Change  VSS, oriented to self only, assist of 2 w/ sera steady, had 3 BMs this shift, LS coarse, more calm and cooperative this shift but emotional, creat-1.40 which delayed discharge, NS running at 100mL/hr, discharge planned for tomorrow to [facility].

## 2018-10-21 NOTE — PROGRESS NOTES
Pc to [facility] to confirmed bed and asked when we should scheduled for transport. Spoke with Patty who reported she needs documentation or nurse reported regarding why patient to kept yesterday before admitting patient. Patty requested to be called at [phone] or fax [phone].   Notified nurse of Patty's request.   Received a call back from Patty reporting they are able to accept patient. Patty requested for transport to be set up ASAP.

## 2018-10-21 NOTE — PLAN OF CARE
Problem: Patient Care Overview  Goal: Plan of Care/Patient Progress Review  Outcome: Adequate for Discharge Date Met: 10/21/18  VSS on RA. Oriented to self only. Pt denies pain and SOB. Pt discharged to [facility] in [place] via [facility] transport. Packet send with EMS for facility. Pt went willingly without incident.

## 2018-10-21 NOTE — PLAN OF CARE
Problem: Patient Care Overview  Goal: Plan of Care/Patient Progress Review  Outcome: No Change  VSS, oriented to self only, assist of 2 w/ sera steady, no BM's overnight, LS coarse, more calm and cooperative this shift, creat-1.40 which delayed discharge, NS running at 100mL/hr, discharge planned for today to [facility].

## 2018-10-23 NOTE — H&P
Admitted:     10/10/2018      Revised        CHIEF COMPLAINT:  Altered mental status and aggression.         HISTORY OF PRESENT ILLNESS:  This is mainly obtained from ER records as the patient is quite demented and not forthcoming.  This is an 88-year-old gentleman with a history of CVA, multiinfarct dementia, arthritis, hypertension, depression who resides at a memory care unit.  He has been doing that for the past couple of weeks.  Prior to that, he was at a rehab facility.  He was brought in by EMS as he resides with his wife in a single room and he was noted to be aggressive towards his wife physically and hence he was brought into the ER for further evaluation.  In the ER over here he was seen by Dr. Schneider.  I discussed care with Dr. Schneider.  I am asked to admit him for further evaluation.         PAST MEDICAL HISTORY:  Significant for type 2 diabetes, peripheral vascular disease, multi-infarct dementia, hyperlipidemia, gout, CKD stage III, hypertension.         PAST SURGICAL HISTORY:  Significant for remote knee surgery.         FAMILY HISTORY:  Significant for father who had cerebrovascular disease.         SOCIAL HISTORY:  Does not smoke or drink alcohol.         HE IS ALLERGIC TO STATINS.         HOME MEDICATION LIST:  Awaiting reconciliation, but includes Tylenol, metformin, Neurontin, Pravachol, Avandia, Zoloft, Brilinta, aspirin.         REVIEW OF SYSTEMS:  Unable to be obtained due to lack of cooperation.         PHYSICAL EXAMINATION:    VITAL SIGNS:  Temperature is 98.7, pulse 62, blood pressure is 158/80, O2 saturation is 95%.    GENERAL:  The patient is alert, awake, oriented to self, not cooperative in no acute distress.         The rest of the physical exam cannot be performed due to lack of cooperation.         Lab work obtained included a basic metabolic panel which is grossly unremarkable other than a creatinine of 1.25, GFR of 54 and a glucose of 178.  A CBC with diff is grossly unremarkable  other than a hemoglobin of 13.2.  A urinalysis does not show any evidence of a UTI.           CT of the head without contrast showed no acute intracranial abnormality.         ASSESSMENT AND PLAN:    1.  Severe dementia.  He resides at a memory care unit.  He has been acting out.  We will admit him under observation status.  We will have him on antipsychotics p.r.n. to control his mood swings.  He will require placement, hence we will consult .  His daughter's home phone number is [phone], her name is Jeannie Camilo.  She is the power of .  She is expecting a call in the morning.    2.  Diabetes.  We will place him on Accu-Cheks along with insulin.    3.  History of CVA.  We will resume his home meds once reconciled.         CODE STATUS:  DNR/DNI, which is per his POLST form which I verified with his power of .      Revised work type lg 10/23/18         ADAMA NIETO MD             D: 10/10/2018   T: 10/10/2018   MT: [id number]      Name:     DULCE MARIA CROFT   MRN:      [id number]        Account:      [id number]   :      1930        Admitted:     10/10/2018                   Document: [id number]       cc: Eric Schneider MD

## 2018-11-12 RX ORDER — PSYLLIUM HUSK 0.4 G
CAPSULE ORAL
Refills: 11 | OUTPATIENT
Start: 2018-11-12